# Patient Record
Sex: FEMALE | Race: WHITE
[De-identification: names, ages, dates, MRNs, and addresses within clinical notes are randomized per-mention and may not be internally consistent; named-entity substitution may affect disease eponyms.]

---

## 2017-12-13 ENCOUNTER — HOSPITAL ENCOUNTER (EMERGENCY)
Dept: HOSPITAL 57 - BURERS | Age: 82
Discharge: NURSING, REHAB OR LONG-TERM CARE | End: 2017-12-13
Payer: MEDICARE

## 2017-12-13 DIAGNOSIS — I10: ICD-10-CM

## 2017-12-13 DIAGNOSIS — S16.1XXA: ICD-10-CM

## 2017-12-13 DIAGNOSIS — I34.1: ICD-10-CM

## 2017-12-13 DIAGNOSIS — S80.00XA: ICD-10-CM

## 2017-12-13 DIAGNOSIS — Z79.82: ICD-10-CM

## 2017-12-13 DIAGNOSIS — S61.012A: Primary | ICD-10-CM

## 2017-12-13 DIAGNOSIS — Z79.4: ICD-10-CM

## 2017-12-13 DIAGNOSIS — Z85.3: ICD-10-CM

## 2017-12-13 DIAGNOSIS — K21.9: ICD-10-CM

## 2017-12-13 DIAGNOSIS — F32.9: ICD-10-CM

## 2017-12-13 DIAGNOSIS — Z79.899: ICD-10-CM

## 2017-12-13 DIAGNOSIS — E66.9: ICD-10-CM

## 2017-12-13 DIAGNOSIS — S70.00XA: ICD-10-CM
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
## 2017-12-13 DIAGNOSIS — I48.91: ICD-10-CM

## 2017-12-13 DIAGNOSIS — E78.5: ICD-10-CM

## 2017-12-13 DIAGNOSIS — E11.9: ICD-10-CM

## 2017-12-13 DIAGNOSIS — S00.83XA: ICD-10-CM

## 2017-12-13 PROCEDURE — 93005 ELECTROCARDIOGRAM TRACING: CPT

## 2017-12-13 PROCEDURE — 70450 CT HEAD/BRAIN W/O DYE: CPT

## 2017-12-13 PROCEDURE — 72125 CT NECK SPINE W/O DYE: CPT

## 2017-12-13 NOTE — RAD
LEFT HAND THREE VIEWS

12/13/17

 

No acute fracture was seen. Severe degenerative changes are present in the first carpometacarpal join
t. There may have been old trauma here. No acute carpal fractures were identified. The thumb appeared
 intact. 

 

IMPRESSION:  

No acute bony finding. 

 

POS: HOME

## 2017-12-13 NOTE — RAD
LEFT HIP THREE VIEWS

12/13/17

 

No fracture of the femoral head or neck was seen. The joint space is only mildly narrowed. There is n
o dislocation. 

 

Only one of the views shows all of the left pubic ring. On that view, there is a questionable line wh
ere the left inferior pubic ramus contacts the body of the pubis. I cannot exclude a minimal nondispl
aced fracture here, but other studies would be needed to confirm it. It could just as easily be an ov
erlapping line from the rectum and the gas within it. 

 

IMPRESSION:  

1.      No evidence of hip fracture. 

 

2.      Equivocal line at the junction of the left inferior pubic ramus and body of the pubis. 

 

Code T

 

POS: HOME

## 2017-12-13 NOTE — RAD
LEFT KNEE FOUR VIEWS

12/13/17

 

No fracture or joint effusion was seen. Degenerative changes consisting of osteophytes and slight med
ial joint space narrowing was noted. The patellofemoral joint is also involved by arthritic change. A
rteriosclerosis is seen in the popliteal artery. 

 

IMPRESSION:  

Degenerative changes but no acute findings. 

 

POS: HOME

## 2017-12-13 NOTE — CT
CT OF THE BRAIN WITHOUT CONTRAST

12/13/17

 

A noncontrast CT was performed following trauma. Comparison is made with prior CT exam dated 5/3.

 

No intracranial bleeding or extra-axial hematoma was seen. Profound deep white matter lucency and are
as of more focal hypolucency are consistent with chronic ischemic changes and probably lacunar infarc
ts. There has been no change since the prior scans, so none appear acute. As before, there is a 2.5 c
m mass that abuts the falx cerebri in the right frontal lobe consistent with a meningioma. Some calci
fication is seen at its margin. It size has not changed over time. The ventricles are normal in size 
for age and atrophy. No skull fractures were seen. The sphenoid sinus is clear as are the visible par
anasal sinuses. The mastoid air cells are clear. 

 

IMPRESSION:  

1.      No acute intracranial findings.

 

2.      2.5 cm right frontal meningioma, stable. 

 

3.      Diffuse prominent chronic ischemic changes. 

 

POS: HOME

## 2017-12-20 ENCOUNTER — HOSPITAL ENCOUNTER (EMERGENCY)
Dept: HOSPITAL 57 - BURERS | Age: 82
Discharge: ANOTHER HOSPITAL | End: 2017-12-20
Payer: MEDICARE

## 2017-12-20 ENCOUNTER — HOSPITAL ENCOUNTER (OUTPATIENT)
Dept: HOSPITAL 92 - ERS | Age: 82
Setting detail: OBSERVATION
LOS: 1 days | Discharge: NURSING, REHAB OR LONG-TERM CARE | End: 2017-12-21
Attending: INTERNAL MEDICINE | Admitting: INTERNAL MEDICINE
Payer: MEDICARE

## 2017-12-20 VITALS — BODY MASS INDEX: 26.5 KG/M2

## 2017-12-20 DIAGNOSIS — Z88.5: ICD-10-CM

## 2017-12-20 DIAGNOSIS — Z88.1: ICD-10-CM

## 2017-12-20 DIAGNOSIS — Z88.0: ICD-10-CM

## 2017-12-20 DIAGNOSIS — Z85.3: ICD-10-CM

## 2017-12-20 DIAGNOSIS — F32.9: ICD-10-CM

## 2017-12-20 DIAGNOSIS — Z79.01: ICD-10-CM

## 2017-12-20 DIAGNOSIS — E66.9: ICD-10-CM

## 2017-12-20 DIAGNOSIS — R07.9: ICD-10-CM

## 2017-12-20 DIAGNOSIS — Z79.899: ICD-10-CM

## 2017-12-20 DIAGNOSIS — Z90.12: ICD-10-CM

## 2017-12-20 DIAGNOSIS — Z91.041: ICD-10-CM

## 2017-12-20 DIAGNOSIS — Z66: ICD-10-CM

## 2017-12-20 DIAGNOSIS — K21.9: ICD-10-CM

## 2017-12-20 DIAGNOSIS — I48.91: ICD-10-CM

## 2017-12-20 DIAGNOSIS — I10: ICD-10-CM

## 2017-12-20 DIAGNOSIS — Z91.81: ICD-10-CM

## 2017-12-20 DIAGNOSIS — Z98.890: ICD-10-CM

## 2017-12-20 DIAGNOSIS — E11.9: ICD-10-CM

## 2017-12-20 DIAGNOSIS — E78.5: ICD-10-CM

## 2017-12-20 DIAGNOSIS — I34.1: ICD-10-CM

## 2017-12-20 DIAGNOSIS — Z79.4: ICD-10-CM
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
## 2017-12-20 DIAGNOSIS — I48.2: ICD-10-CM

## 2017-12-20 DIAGNOSIS — Z88.8: ICD-10-CM

## 2017-12-20 DIAGNOSIS — Z82.49: ICD-10-CM

## 2017-12-20 DIAGNOSIS — Z90.710: ICD-10-CM

## 2017-12-20 DIAGNOSIS — E78.00: ICD-10-CM

## 2017-12-20 DIAGNOSIS — Z92.3: ICD-10-CM

## 2017-12-20 DIAGNOSIS — Z85.841: ICD-10-CM

## 2017-12-20 DIAGNOSIS — Z91.5: ICD-10-CM

## 2017-12-20 DIAGNOSIS — Z79.82: ICD-10-CM

## 2017-12-20 DIAGNOSIS — N39.0: ICD-10-CM

## 2017-12-20 DIAGNOSIS — R07.9: Primary | ICD-10-CM

## 2017-12-20 DIAGNOSIS — R79.89: ICD-10-CM

## 2017-12-20 DIAGNOSIS — S80.11XA: Primary | ICD-10-CM

## 2017-12-20 DIAGNOSIS — Z90.49: ICD-10-CM

## 2017-12-20 LAB
ALBUMIN SERPL BCG-MCNC: 3.7 G/DL (ref 3.4–4.8)
ALP SERPL-CCNC: 76 U/L (ref 40–150)
ALP SERPL-CCNC: 79 U/L (ref 40–150)
ALT SERPL W P-5'-P-CCNC: 15 U/L (ref 8–55)
ALT SERPL W P-5'-P-CCNC: 18 U/L (ref 8–55)
ANION GAP SERPL CALC-SCNC: 16 MMOL/L (ref 10–20)
ANION GAP SERPL CALC-SCNC: 17 MMOL/L (ref 10–20)
APTT PPP: 35.5 SEC (ref 22.9–36.1)
AST SERPL-CCNC: 20 U/L (ref 5–34)
AST SERPL-CCNC: 20 U/L (ref 5–34)
BACTERIA UR QL AUTO: (no result) HPF
BASOPHILS # BLD AUTO: 0.1 THOU/UL (ref 0–0.2)
BASOPHILS # BLD AUTO: 0.1 THOU/UL (ref 0–0.2)
BASOPHILS NFR BLD AUTO: 0.5 % (ref 0–1)
BASOPHILS NFR BLD AUTO: 0.6 % (ref 0–1)
BILIRUB SERPL-MCNC: 0.5 MG/DL (ref 0.2–1.2)
BILIRUB SERPL-MCNC: 0.5 MG/DL (ref 0.2–1.2)
BUN SERPL-MCNC: 23 MG/DL (ref 9.8–20.1)
BUN SERPL-MCNC: 25 MG/DL (ref 9.8–20.1)
CALCIUM SERPL-MCNC: 10.2 MG/DL (ref 7.8–10.44)
CALCIUM SERPL-MCNC: 9.8 MG/DL (ref 7.8–10.44)
CHLORIDE SERPL-SCNC: 100 MMOL/L (ref 98–107)
CHLORIDE SERPL-SCNC: 99 MMOL/L (ref 98–107)
CK MB SERPL-MCNC: 1.7 NG/ML (ref 0–6.6)
CO2 SERPL-SCNC: 25 MMOL/L (ref 23–31)
CO2 SERPL-SCNC: 29 MMOL/L (ref 23–31)
CREAT CL PREDICTED SERPL C-G-VRATE: 0 ML/MIN (ref 70–130)
CREAT CL PREDICTED SERPL C-G-VRATE: 0 ML/MIN (ref 70–130)
DIGOXIN SERPL-MCNC: 1.16 NG/ML (ref 0.8–2)
EOSINOPHIL # BLD AUTO: 0.1 THOU/UL (ref 0–0.7)
EOSINOPHIL # BLD AUTO: 0.2 THOU/UL (ref 0–0.7)
EOSINOPHIL NFR BLD AUTO: 1.5 % (ref 0–10)
EOSINOPHIL NFR BLD AUTO: 2.1 % (ref 0–10)
GLOBULIN SER CALC-MCNC: 3 G/DL (ref 2.4–3.5)
GLOBULIN SER CALC-MCNC: 3.2 G/DL (ref 2.4–3.5)
GLUCOSE SERPL-MCNC: 71 MG/DL (ref 83–110)
HCT VFR BLD CALC: 39.4 % (ref 36–47)
HGB BLD-MCNC: 13 G/DL (ref 12–16)
INR PPP: 1.4
LYMPHOCYTES # BLD AUTO: 2.5 THOU/UL (ref 1.2–3.4)
LYMPHOCYTES # BLD: 2.2 THOU/UL (ref 1.2–3.4)
LYMPHOCYTES NFR BLD AUTO: 23.7 % (ref 21–51)
LYMPHOCYTES NFR BLD AUTO: 25.4 % (ref 21–51)
MAGNESIUM SERPL-MCNC: 1.9 MG/DL (ref 1.6–2.6)
MCH RBC QN AUTO: 28.7 PG (ref 27–31)
MCV RBC AUTO: 90 FL (ref 81–99)
MONOCYTES # BLD AUTO: 0.9 THOU/UL (ref 0.11–0.59)
MONOCYTES # BLD AUTO: 1 THOU/UL (ref 0.11–0.59)
MONOCYTES NFR BLD AUTO: 10.9 % (ref 0–10)
MONOCYTES NFR BLD AUTO: 9.5 % (ref 0–10)
NEUTROPHILS # BLD AUTO: 6 THOU/UL (ref 1.4–6.5)
NEUTROPHILS # BLD AUTO: 6.2 THOU/UL (ref 1.4–6.5)
NEUTROPHILS NFR BLD AUTO: 62.3 % (ref 42–75)
PLATELET # BLD AUTO: 227 THOU/UL (ref 130–400)
POTASSIUM SERPL-SCNC: 4.4 MMOL/L (ref 3.5–5.1)
PROTHROMBIN TIME: 17.1 SEC (ref 12–14.7)
RBC # BLD AUTO: 4.25 MILL/UL (ref 4.2–5.4)
RBC # BLD AUTO: 4.55 MILL/UL (ref 4.2–5.4)
SODIUM SERPL-SCNC: 140 MMOL/L (ref 136–145)
SP GR UR STRIP: 1.01 (ref 1–1.03)
TROPONIN I SERPL DL<=0.01 NG/ML-MCNC: (no result) NG/ML (ref ?–0.03)
TROPONIN I SERPL DL<=0.01 NG/ML-MCNC: 0.02 NG/ML (ref ?–0.03)
TROPONIN I SERPL DL<=0.01 NG/ML-MCNC: 0.03 NG/ML (ref ?–0.03)
TROPONIN I SERPL DL<=0.01 NG/ML-MCNC: 0.04 NG/ML (ref ?–0.03)
WBC # BLD AUTO: 9.4 THOU/UL (ref 4.8–10.8)
WBC # BLD AUTO: 9.9 THOU/UL (ref 4.8–10.8)

## 2017-12-20 PROCEDURE — G0378 HOSPITAL OBSERVATION PER HR: HCPCS

## 2017-12-20 PROCEDURE — 93005 ELECTROCARDIOGRAM TRACING: CPT

## 2017-12-20 PROCEDURE — 83735 ASSAY OF MAGNESIUM: CPT

## 2017-12-20 PROCEDURE — A9500 TC99M SESTAMIBI: HCPCS

## 2017-12-20 PROCEDURE — 85025 COMPLETE CBC W/AUTO DIFF WBC: CPT

## 2017-12-20 PROCEDURE — 94760 N-INVAS EAR/PLS OXIMETRY 1: CPT

## 2017-12-20 PROCEDURE — 51701 INSERT BLADDER CATHETER: CPT

## 2017-12-20 PROCEDURE — 81003 URINALYSIS AUTO W/O SCOPE: CPT

## 2017-12-20 PROCEDURE — A4216 STERILE WATER/SALINE, 10 ML: HCPCS

## 2017-12-20 PROCEDURE — 71010: CPT

## 2017-12-20 PROCEDURE — 84484 ASSAY OF TROPONIN QUANT: CPT

## 2017-12-20 PROCEDURE — 81015 MICROSCOPIC EXAM OF URINE: CPT

## 2017-12-20 PROCEDURE — 78452 HT MUSCLE IMAGE SPECT MULT: CPT

## 2017-12-20 PROCEDURE — 36415 COLL VENOUS BLD VENIPUNCTURE: CPT

## 2017-12-20 PROCEDURE — 36416 COLLJ CAPILLARY BLOOD SPEC: CPT

## 2017-12-20 PROCEDURE — 84443 ASSAY THYROID STIM HORMONE: CPT

## 2017-12-20 PROCEDURE — 93017 CV STRESS TEST TRACING ONLY: CPT

## 2017-12-20 PROCEDURE — 84100 ASSAY OF PHOSPHORUS: CPT

## 2017-12-20 PROCEDURE — 99285 EMERGENCY DEPT VISIT HI MDM: CPT

## 2017-12-20 PROCEDURE — 87186 SC STD MICRODIL/AGAR DIL: CPT

## 2017-12-20 PROCEDURE — 72170 X-RAY EXAM OF PELVIS: CPT

## 2017-12-20 PROCEDURE — 85730 THROMBOPLASTIN TIME PARTIAL: CPT

## 2017-12-20 PROCEDURE — 82553 CREATINE MB FRACTION: CPT

## 2017-12-20 PROCEDURE — 80162 ASSAY OF DIGOXIN TOTAL: CPT

## 2017-12-20 PROCEDURE — 85610 PROTHROMBIN TIME: CPT

## 2017-12-20 PROCEDURE — 82962 GLUCOSE BLOOD TEST: CPT

## 2017-12-20 PROCEDURE — 96374 THER/PROPH/DIAG INJ IV PUSH: CPT

## 2017-12-20 PROCEDURE — 87077 CULTURE AEROBIC IDENTIFY: CPT

## 2017-12-20 PROCEDURE — 80053 COMPREHEN METABOLIC PANEL: CPT

## 2017-12-20 PROCEDURE — 87086 URINE CULTURE/COLONY COUNT: CPT

## 2017-12-20 PROCEDURE — 70450 CT HEAD/BRAIN W/O DYE: CPT

## 2017-12-20 PROCEDURE — A4353 INTERMITTENT URINARY CATH: HCPCS

## 2017-12-20 PROCEDURE — 80048 BASIC METABOLIC PNL TOTAL CA: CPT

## 2017-12-20 NOTE — RAD
PELVIS ONE VIEW

12/20/17

 

No fracture was seen. The pelvic bones appear intact. The hips appear intact. The symphysis shows no 
widening or off-set. The SI joints are symmetrical. 

 

IMPRESSION:  

No acute bony findings. 

 

POS: HOME

## 2017-12-20 NOTE — RAD
PORTABLE CHEST

12/20/17

 

PROVIDED CLINICAL HISTORY:  

Chest pain.

 

FINDINGS:  

Comparison 12/20/17, 8:57 a.m.

 

The cardiac and mediastinal silhouette remains within normal limits. There is no focal consolidation,
 pleural fluid or pneumothorax apparent. Atherosclerosis is demonstrated. 

 

IMPRESSION:  

No evidence for an acute cardiopulmonary process. 

 

POS: [facility]

## 2017-12-20 NOTE — HP
PRIMARY CARE PROVIDER:  Eleazar Julio M.D.

 

CHIEF COMPLAINT:  Chest pain.

 

HISTORY OF PRESENT ILLNESS:  Ms. Kidd is a pleasant 88-year-old lady who was seen at [facility] on 12/20/2017 after transferred from [place].

 

She is able to provide a good history.  Two weeks ago, she sustained a fall and hit her head as well 
as left upper extremity.  She reports that there were no fractures.  She was doing well until today w
hen she tripped and fell on her buttocks.  Following that, she was sent to the emergency room at [facility].  There, she developed chest pain.  She reports it as left-sided, sharp, 10/10 at its worst,
 nonradiating, accompanied by shortness of breath, and not accompanied by nausea or dizziness.  On fu
rther questioning, she reports that she has been having this pain on and off for the last 3 months.  
She cannot recall any aggravating or relieving factors.

 

REVIEW OF SYSTEMS:  The following complete review of systems was negative, unless otherwise mentioned
 in the HPI or below:

Constitutional:  Weight loss or gain, sense of well-being, ability to conduct usual activities, exerc
ise tolerance.

Skin/Breast:  Rash, itching, changes in hair growth or loss, nail changes, breast lumps, tenderness, 
swelling, nipple discharge.

Eyes:  Vision, double vision, tearing, blind spots, pain.

ENT/Mouth:  Headaches (location, time of onset, duration, precipitating factors), vertigo, lightheade
dness, injury. Vision, double vision, tearing, blind spots, pain, nose bleeding, colds, obstruction, 
discharge, dental difficulties, gingival bleeding, dentures, neck stiffness, pain, tenderness, masses
 in thyroid or other areas.

Cardiovascular:  Precordial pain, substernal distress, palpitations, syncope, dyspnea on exertion, or
thopnea, nocturnal paroxysmal dyspnea, edema, cyanosis, hypertension, heart murmurs, varicosities, ph
lebitis, claudication.

Respiratory:  Pain, shortness of breath, wheezing, stridor, cough, hemoptysis, fever or night sweats.


Gastrointestinal:  Poor appetite, dysphagia, indigestion, abdominal pain, heartburn, eructation, naus
ea, vomiting, hematemesis, jaundice, constipation, or diarrhea, abnormal stools (nimo-colored, tarry,
 bloody, greasy, foul smelling), flatulence, hemorrhoids, recent changes in bowel habits.

Genitourinary:  Urgency, frequency, dysuria, nocturia, hematuria, polyuria, oliguria, unusual (or mark
nge in) color of urine, stones, hesitancy, change in size of stream, dribbling, acute retention or in
continence, libido, potency.

Musculoskeletal:  Pain, swelling, redness or heat of muscles or joints, limitation, of motion, muscul
ar weakness, atrophy, cramps.

Neurologic/Psychiatric:  Convulsions, paralyses, tremor, incoordination, paresthesias, difficulties w
ith memory of speech, sensory or motor disturbances, or muscular coordination (ataxia, tremor), emoti
onal problems, anxiety, depression, previous psychiatric care, unusual perceptions, hallucinations.

Allergy/Immunologic:  Skin rash, anemia, bleeding tendency, polydipsia, polyuria, intolerance to heat
 or cold.

 

PAST MEDICAL HISTORY:  Significant for chronic atrial fibrillation, diabetes mellitus type 2, hyperte
nsion, mitral valve prolapse, gastroesophageal reflux disease, meningioma.

 

PAST SURGICAL HISTORY:  Significant for appendectomy, hysterectomy, cholecystectomy, and left mastect
rhoda for benign lesion.

 

CODE STATUS:  I discussed with code status.  She is DNR.

 

FAMILY HISTORY:  Diabetes mellitus and heart disease in her mother, heart attacks in her brothers and
 cancer and heart disease in her sisters.

 

ALLERGIES:  IODINE, LEVOFLOXACIN, PENICILLIN G, CODEINE, and DIAZEPAM.

 

CURRENT MEDICATIONS:  This list will need to be reviewed, but appears to include Maalox, vitamin C, d
igoxin, Aricept, Cymbalta, Pepcid, Lasix, gabapentin, Lantus insulin, isosorbide mononitrate, lisinop
ril, metformin, Lopressor, MiraLax, rivaroxaban, simvastatin, and tramadol.

 

SOCIAL HISTORY:  The patient denies tobacco use, alcohol use or recreational drug use.

 

PHYSICAL EXAMINATION:

GENERAL:  On examination, Ms. Kidd is awake and alert, not in acute distress.

VITAL SIGNS:  Blood pressure is 127/74, pulse is 53.  She is breathing at rate of 21 and saturating 9
5% on room air.  She is afebrile.

EYES:  No scleral icterus, no conjunctival pallor.

ENT:  Moist mucosal membranes, no oropharyngeal erythema or exudates.

NECK:  Supple, nontender, normal range of movement, trachea is midline.

RESPIRATORY:  Accessory muscles of breathing are not active.

LUNGS:  Clear to auscultation without wheeze, rhonchi or crepitations.

CARDIOVASCULAR:  S1 and S2 are heard, regular.

LUNGS:  Peripheral pulses palpable.  No carotid bruit, no pericardial rub.

ABDOMEN:  Soft, nontender, bowel sounds heard, no hepatomegaly, no splenomegaly.

MUSCULOSKELETAL:  Power is 5/5 in all 4 extremities.  Normal range of movement at all major extremity
 joints.

SKIN:  Hematoma over the right shin, hematoma over the left thumb.

LYMPHATIC:  No cervical lymphadenopathy.

NEUROLOGIC:  Cranial nerves II-XII are intact, deep tendon reflexes are 2+.

PSYCHIATRIC:  Normal mood, normal affect, patient is oriented to person, place, and time.

 

LABORATORY DATA:  Ms. Kidd's labs and investigations were reviewed.  I reviewed her electrocardiog
ayaz, which shows atrial fibrillation with slow ventricular response, T-wave inversion in the inferior
 leads and ST segment depressions in the lateral leads.  I also reviewed her chest x-ray, which does 
not show any pulmonary infiltrates.  Laboratory investigations show an unremarkable CBC, unremarkable
 comprehensive metabolic profile except for blood urea nitrogen elevated at 23 and a normal TSH.  Tro
ponin I was 0.018 at 1903 hours, 0.037 at 0820 hours.  Urinalysis is positive for small amount of chema
kocyte esterase and for nitrite.

 

ASSESSMENT AND PLAN:  Ms. Kidd is a pleasant 88-year-old lady who was seen at [facility] on 12/20/2017.  Her problem list includes:

1.  Chest pain:  Ms. Kidd has significant risk factors for coronary artery disease.  She will be a
dmitted to the hospital for telemetry monitoring and for ruling out acute coronary syndrome.  Nuclear
 stress test will be ordered.

2.  Fall:  She will be seen by walking service program to assess her mobility.  She appears to have h
ad mechanical fall.

3.  Urinary tract infection:  Given her allergy to LEVOFLOXACIN and PENICILLIN, we will start Ms. Desiree scott on nitrofurantoin and await urine cultures.

4.  Atrial fibrillation:  Appears to be stable.

5.  Diabetes mellitus:  Start Accu-Cheks, insulin sliding scale.

6.  Dyslipidemia:  Continue her home medications.

7.  Hypertension:  Monitor vital signs, titrate antihypertensives as needed.

 

Many thanks for allowing me to participate in your patient's care.  Please feel free to contact me wi
th any questions or concerns.

 

LEVEL OF RISK:  High.

 

LEVEL OF COMPLEXITY:  High.

## 2017-12-20 NOTE — RAD
PORTABLE CHEST

12/20/17

 

An AP portable film at 0857 is compared with a 9/12/17 study. 

 

The heart is mildly enlarged but unchanged in size. There is no mediastinal widening or shift. The tiffanie
ngs are clear. There are no effusions. 

 

IMPRESSION:  

No acute thoracic finding. 

 

POS: HOME

## 2017-12-20 NOTE — CT
CT OF THE BRAIN WITHOUT CONTRAST

12/20/17

 

Comparison is made with the 12/13 study. 

 

Again  noted is the 2.5 cm right falx meningioma anteriorly. No intracranial bleeding or extra-axial 
hematoma was seen. There is no sign of edema. The ventricular sizes are normal for age and atrophy. D
iffuse ischemic changes are present as usual. The calvarium appears intact. There is no air fluid lev
el in the sphenoid sinus or any of the visible paranasal sinuses. 

 

IMPRESSION:  

1.      No acute traumatic intracranial findings. 

2.      2.5 cm right frontal mass consistent with a meningioma. Unchanged from prior study. 

 

POS: HOME

## 2017-12-21 VITALS — TEMPERATURE: 98.5 F | SYSTOLIC BLOOD PRESSURE: 134 MMHG | DIASTOLIC BLOOD PRESSURE: 63 MMHG

## 2017-12-21 LAB
ANION GAP SERPL CALC-SCNC: 14 MMOL/L (ref 10–20)
BASOPHILS # BLD AUTO: 0 THOU/UL (ref 0–0.2)
BASOPHILS NFR BLD AUTO: 0.3 % (ref 0–1)
BUN SERPL-MCNC: 22 MG/DL (ref 9.8–20.1)
CALCIUM SERPL-MCNC: 9.5 MG/DL (ref 7.8–10.44)
CHLORIDE SERPL-SCNC: 99 MMOL/L (ref 98–107)
CO2 SERPL-SCNC: 28 MMOL/L (ref 23–31)
CREAT CL PREDICTED SERPL C-G-VRATE: 59 ML/MIN (ref 70–130)
EOSINOPHIL # BLD AUTO: 0.1 THOU/UL (ref 0–0.7)
EOSINOPHIL NFR BLD AUTO: 1.9 % (ref 0–10)
HCT VFR BLD CALC: 36.8 % (ref 36–47)
LYMPHOCYTES # BLD: 2 THOU/UL (ref 1.2–3.4)
LYMPHOCYTES NFR BLD AUTO: 27 % (ref 21–51)
MONOCYTES # BLD AUTO: 1 THOU/UL (ref 0.11–0.59)
MONOCYTES NFR BLD AUTO: 13.4 % (ref 0–10)
NEUTROPHILS # BLD AUTO: 4.2 THOU/UL (ref 1.4–6.5)
RBC # BLD AUTO: 4.03 MILL/UL (ref 4.2–5.4)
WBC # BLD AUTO: 7.3 THOU/UL (ref 4.8–10.8)

## 2017-12-21 NOTE — NM
CARDIAC SPECT:

 

CLINICAL HISTORY:

Chest pain, hypertension, diabetes, atrial fibrillation, and dyslipidemia. 

 

TECHNIQUE:  

A myocardial perfusion scan was performed using the single isotope one day protocol with technetium-9
9m sestamibi. 9 mCi were injected intravenously for the rest exam followed by 27 mCi for the stress e
xam. Pharmacologic stress with Lexiscan was monitored and interpreted by Dr. Bryant. 

 

FINDINGS:

Homogeneous tracer distribution is seen in the myocardial segments on stress and rest images without 
fixed or reversible defects. 

 

GATED SPECT LVEF:

65%. 

 

WALL MOTION EXAM:

Normal. 

 

IMPRESSION: 

No evidence of reversible ischemia. 

 

 

POS: [facility]

## 2017-12-21 NOTE — PDOC.PN
- Subjective


Encounter Start Date: 12/21/17


Encounter Start Time: 08:34


Subjective: Seen and examined feeling okay





- Objective


Resuscitation Status: 


 











Resuscitation Status           DNR:Do Not Resuscitate














Vital Signs & Weight: 


 Vital Signs (12 hours)











  Temp Pulse Resp BP Pulse Ox


 


 12/21/17 08:17  97.5 F L  63  16  131/63  97


 


 12/21/17 03:45  97.9 F  66  16  119/57 L  96


 


 12/20/17 22:35  97.8 F  56 L  16  


 


 12/20/17 22:00      94 L


 


 12/20/17 21:32  97.8 F  56 L  16  113/58 L  94 L








 Weight











Weight                         169 lb 4.8 oz














I&O: 


 











 12/20/17 12/21/17 12/22/17





 06:59 06:59 06:59


 


Intake Total  480 


 


Balance  480 











Result Diagrams: 


 12/21/17 05:44





 12/21/17 05:44


Additional Labs: 


 Accuchecks











  12/21/17 12/20/17





  05:44 22:39


 


POC Glucose  159 H  129 H














Phys Exam





- Physical Examination


Constitutional: NAD


HEENT: PERRLA, moist MMs, sclera anicteric, TM's clear


Neck: no nodes, no JVD, supple, full ROM


Respiratory: no wheezing, no rales, no rhonchi, clear to auscultation bilateral


Cardiovascular: RRR, no significant murmur, no rub


Gastrointestinal: soft, non-tender, no distention, positive bowel sounds


Musculoskeletal: no edema, pulses present





Dx/Plan


(1) Fall at home


Code(s): W19.XXXA - UNSPECIFIED FALL, INITIAL ENCOUNTER; Y92.099 - UNSP PLACE 

IN [facility] NON-INSTITUTIONAL RESIDENCE AS PLACE   Status: Acute   





(2) UTI (urinary tract infection)


Status: Acute   Comment: Present on Admission.   





(3) Atrial fibrillation


Code(s): I48.91 - UNSPECIFIED ATRIAL FIBRILLATION   Status: Chronic   





(4) Chest pain


Code(s): R07.9 - CHEST PAIN, UNSPECIFIED   Status: Acute   





- Plan


continue antibiotics, PT/OT, 


follow up on urine cultures


-: Continue NTF for now





* .

## 2017-12-26 ENCOUNTER — HOSPITAL ENCOUNTER (INPATIENT)
Dept: HOSPITAL 92 - ERS | Age: 82
LOS: 4 days | Discharge: NURSING, REHAB OR LONG-TERM CARE | DRG: 309 | End: 2017-12-30
Attending: INTERNAL MEDICINE | Admitting: INTERNAL MEDICINE
Payer: MEDICARE

## 2017-12-26 VITALS — BODY MASS INDEX: 26.6 KG/M2

## 2017-12-26 DIAGNOSIS — Z66: ICD-10-CM

## 2017-12-26 DIAGNOSIS — R00.1: Primary | ICD-10-CM

## 2017-12-26 DIAGNOSIS — E78.5: ICD-10-CM

## 2017-12-26 DIAGNOSIS — Z91.81: ICD-10-CM

## 2017-12-26 DIAGNOSIS — I10: ICD-10-CM

## 2017-12-26 DIAGNOSIS — I34.1: ICD-10-CM

## 2017-12-26 DIAGNOSIS — S80.11XA: ICD-10-CM

## 2017-12-26 DIAGNOSIS — E11.9: ICD-10-CM
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
## 2017-12-26 DIAGNOSIS — T46.0X5A: ICD-10-CM

## 2017-12-26 DIAGNOSIS — I48.2: ICD-10-CM

## 2017-12-26 DIAGNOSIS — E87.2: ICD-10-CM

## 2017-12-26 DIAGNOSIS — T38.3X5A: ICD-10-CM

## 2017-12-26 DIAGNOSIS — Z79.4: ICD-10-CM

## 2017-12-26 LAB
ALBUMIN SERPL BCG-MCNC: 3.5 G/DL (ref 3.4–4.8)
ALP SERPL-CCNC: 84 U/L (ref 40–150)
ALT SERPL W P-5'-P-CCNC: 15 U/L (ref 8–55)
ANION GAP SERPL CALC-SCNC: 15 MMOL/L (ref 10–20)
AST SERPL-CCNC: 17 U/L (ref 5–34)
BASOPHILS # BLD AUTO: 0 THOU/UL (ref 0–0.2)
BASOPHILS NFR BLD AUTO: 0.2 % (ref 0–1)
BILIRUB SERPL-MCNC: 0.4 MG/DL (ref 0.2–1.2)
BUN SERPL-MCNC: 20 MG/DL (ref 9.8–20.1)
CALCIUM SERPL-MCNC: 9.2 MG/DL (ref 7.8–10.44)
CHLORIDE SERPL-SCNC: 98 MMOL/L (ref 98–107)
CK MB SERPL-MCNC: 1.2 NG/ML (ref 0–6.6)
CK SERPL-CCNC: 22 U/L (ref 29–168)
CO2 SERPL-SCNC: 30 MMOL/L (ref 23–31)
CREAT CL PREDICTED SERPL C-G-VRATE: 0 ML/MIN (ref 70–130)
DIGOXIN SERPL-MCNC: 1.13 NG/ML (ref 0.8–2)
EOSINOPHIL # BLD AUTO: 0.4 THOU/UL (ref 0–0.7)
EOSINOPHIL NFR BLD AUTO: 4 % (ref 0–10)
GLOBULIN SER CALC-MCNC: 3 G/DL (ref 2.4–3.5)
GLUCOSE SERPL-MCNC: 98 MG/DL (ref 83–110)
HGB BLD-MCNC: 12.4 G/DL (ref 12–16)
INR PPP: 1.2
LYMPHOCYTES # BLD: 4 THOU/UL (ref 1.2–3.4)
LYMPHOCYTES NFR BLD AUTO: 40.7 % (ref 21–51)
MCH RBC QN AUTO: 30.3 PG (ref 27–31)
MCV RBC AUTO: 93.2 FL (ref 81–99)
MONOCYTES # BLD AUTO: 0.9 THOU/UL (ref 0.11–0.59)
MONOCYTES NFR BLD AUTO: 8.8 % (ref 0–10)
NEUTROPHILS # BLD AUTO: 4.5 THOU/UL (ref 1.4–6.5)
NEUTROPHILS NFR BLD AUTO: 46.3 % (ref 42–75)
PLATELET # BLD AUTO: 272 THOU/UL (ref 130–400)
POTASSIUM SERPL-SCNC: 4.9 MMOL/L (ref 3.5–5.1)
PROTHROMBIN TIME: 15 SEC (ref 12–14.7)
RBC # BLD AUTO: 4.09 MILL/UL (ref 4.2–5.4)
SODIUM SERPL-SCNC: 138 MMOL/L (ref 136–145)
TROPONIN I SERPL DL<=0.01 NG/ML-MCNC: 0.02 NG/ML (ref ?–0.03)
WBC # BLD AUTO: 9.8 THOU/UL (ref 4.8–10.8)

## 2017-12-26 PROCEDURE — 83880 ASSAY OF NATRIURETIC PEPTIDE: CPT

## 2017-12-26 PROCEDURE — 96360 HYDRATION IV INFUSION INIT: CPT

## 2017-12-26 PROCEDURE — 80162 ASSAY OF DIGOXIN TOTAL: CPT

## 2017-12-26 PROCEDURE — 85730 THROMBOPLASTIN TIME PARTIAL: CPT

## 2017-12-26 PROCEDURE — 85025 COMPLETE CBC W/AUTO DIFF WBC: CPT

## 2017-12-26 PROCEDURE — 83605 ASSAY OF LACTIC ACID: CPT

## 2017-12-26 PROCEDURE — 94760 N-INVAS EAR/PLS OXIMETRY 1: CPT

## 2017-12-26 PROCEDURE — 80053 COMPREHEN METABOLIC PANEL: CPT

## 2017-12-26 PROCEDURE — 93005 ELECTROCARDIOGRAM TRACING: CPT

## 2017-12-26 PROCEDURE — 85610 PROTHROMBIN TIME: CPT

## 2017-12-26 PROCEDURE — 84484 ASSAY OF TROPONIN QUANT: CPT

## 2017-12-26 PROCEDURE — 36416 COLLJ CAPILLARY BLOOD SPEC: CPT

## 2017-12-26 PROCEDURE — 96361 HYDRATE IV INFUSION ADD-ON: CPT

## 2017-12-26 PROCEDURE — 71010: CPT

## 2017-12-26 PROCEDURE — 87040 BLOOD CULTURE FOR BACTERIA: CPT

## 2017-12-26 PROCEDURE — 82553 CREATINE MB FRACTION: CPT

## 2017-12-26 PROCEDURE — 36415 COLL VENOUS BLD VENIPUNCTURE: CPT

## 2017-12-26 NOTE — ULT
ULTRASOUND WITH DOPPLER DUPLEX VENOUS LOWER EXTREMITY RIGHT

 

CPT: 18966

ICD-10-PCS: B54D

 

HISTORY: Right leg edema.

 

TECHNIQUE:

Color flow Doppler, spectral waveform analysis of pulsed Doppler, and gray-scale imaging with renata
gilles and augmentation, were used to evaluate the right common femoral, femoral, popliteal, posterior 
tibial, and superficial femoral, veins; and the proximal portions of the profunda femoral and greater
 saphenous, veins.

 

FINDINGS:

Appropriate compression and flow within the imaged deep vein system of the right lower extremity.

 

IMPRESSION:

No DVT.

 

POS: [facility]

## 2017-12-26 NOTE — RAD
CHEST ONE VIEW

12/26/17

 

HISTORY: 

Dyspnea. 

 

COMPARISON:  

12/20/17.

 

FINDINGS:  

The cardiac silhouette is magnified and enlarged. Pulmonary vasculature is unremarkable. Mediastinum 
is midline with aortic calcification. There is no confluent air space consolidation or evidence of pn
eumothorax. Postoperative changes of the left axilla are apparent. Cardiac monitor leads overlie the 
chest. 

 

IMPRESSION:  

Chronic type findings are stable. No active cardiopulmonary abnormalities are demonstrated. 

 

POS: [facility]

## 2017-12-27 RX ADMIN — Medication SCH MG: at 21:02

## 2017-12-27 NOTE — HP
CHIEF COMPLAINT:  Feels dizziness and right leg pain.

 

HISTORY OF PRESENT ILLNESS:  She is an 88-year-old woman with a history of 
hypertension, arrhythmias, atrial fibrillation, mitral valve prolapse, type 2 
diabetes.  She came in because she was sent from nursing home because she has 
multiple falls and feeling dizziness and has swelling in right lower 
extremities.  The patient came in the ER, blood pressure 98/57, pulse 50, pulse 
went down to 40 with the respiratory rate 20, temperature 97.8.  The patient 
was admitted for dizziness, junctional bradycardia and possible DVT in the 
right leg.

 

PAST MEDICAL HISTORY:  As mentioned, history of atrial fibrillation, mitral 
valve prolapse, hyperlipidemia, hypertension.

 

PAST SURGICAL HISTORY:  History of cholecystectomy, mastectomy of left breast, 
bladder suspension surgery, appendectomy.

 

PSYCHIATRIC HISTORY:  Depression.

 

SOCIAL HISTORY:  No alcohol use, no drug use, no smoking history.

 

MEDICATION HISTORY:  She is taking a lot of medicine.

 

ALLERGIES:  She has an allergy to LEVAQUIN, PENICILLIN, and VALIUM.

 

MEDICATIONS:  She is taking Lantus for diabetes, losartan 50 mg daily, 
metformin 1000 twice daily, Xarelto 10 mg daily, Zocor 20 mg daily, calcium 1 
tablet twice daily, Cymbalta 30 mg daily, digoxin 0.125 mg daily, Imdur 30 mg 
daily, lisinopril 5 mg daily, Aricept 5 mg daily, simvastatin 20 mg daily, 
gabapentin 400 mg twice daily; glimepiride 2 mg once daily, nebulizer, aspirin 
81 daily, Protonix 40 mg daily.

 

REVIEW OF SYSTEMS:  HEENT:  She denies any headache, does have dizziness.  Ear, 
nose, throat, no problems.  Cardiovascular:  No chest pain, no palpitation.  
Respirations:  Some shortness of breath, no cough.  Gastrointestinal:  No nausea
, no vomiting.  Musculoskeletal:  She has swelling of right leg.  Skin:  Some 
bruises on the right leg.  Neurologic:  She has some dizziness.  Review of 
systems was negative except for history and physical.

 

PHYSICAL EXAMINATION:

GENERAL:  She is an elderly woman looks younger than age.  Alert, oriented, not 
in distress.

VITAL SIGNS:  Pulse 40s, bradycardic.

HEENT:  Head is atraumatic, normocephalic.  Pupils round and reactive.  
External ocular muscles are intact.  Ears, nose, throat normal.  Tongue mucosa 
moist.

NECK:  Supple, no JVD, no thyromegaly, no carotid bruit.

CARDIOVASCULAR:  S1, S2 audible.  Bradycardic.  No S3, S4.

ABDOMEN:  Soft.  Bowel sounds audible.  No organomegaly.

RESPIRATIONS:  Diminished breath sounds, no wheezing, no crackles.  Chest wall, 
no tenderness.

NEUROLOGICAL:  Alert, oriented x3.  No focal deficit.

 

LABORATORY DATA:  Her labs shows lactic acid 2.8.  CBC shows WBC 9.8, 
hemoglobin 12.4, hematocrit 38.1, platelets 272.  Digoxin level 1.13, it is 
normal range.  Sodium 138, potassium 4.9, chloride 30, anion gap 15, BUN 20, 
creatinine 0.94, glucose 98, calcium 9.2, bilirubin 0.4, albumin 3.5, globulin 
1.2, AST 17, ALT 15.  PTT 33, INR 1.2.  Troponin 0.017, CK 1.2.  .8.

 

ASSESSMENT AND PLAN:

1.  Dizziness with junctional bradycardia could be  sec to Meds, so we will 
rule out ischemia by serial troponin, echocardiogram, and hold her digoxin and 
other AV uyri blocking agent.  Cardiology consulted, pacemaker at the bedside.

2.  Injury to the right leg with deep venous thrombosis.  Doppler scan Pending 
and we will follow the report.

3.  Type 2 diabetes, insulin requiring.  Continue sliding scales and we will 
follow her.

4.  Deep venous thrombosis prophylaxis with Lovenox.

 

MTDD

## 2017-12-27 NOTE — PDOC.PN
- Subjective


Encounter Start Date: 12/27/17


Encounter Start Time: 15:20


Subjective: Pt seen and examined for Junctional bradycardia


-: Feeling better


-: no CP, SOB





- Objective


MAR Reviewed: Yes


Vital Signs & Weight: 


 Vital Signs (12 hours)











  Temp Pulse Resp BP Pulse Ox


 


 12/27/17 15:12  97.9 F  62  20  105/57 L  95


 


 12/27/17 11:45  97.6 F  64  16  106/59 L  94 L


 


 12/27/17 08:40  98.0 F  58 L  16  


 


 12/27/17 08:05  98.0 F  58 L  16  110/62  95


 


 12/27/17 05:00   64  18  102/57 L 


 


 12/27/17 04:00  97.4 F L  61  17  96/54 L  95








 Weight











Weight                         170 lb 6.4 oz














I&O: 


 











 12/26/17 12/27/17 12/28/17





 06:59 06:59 06:59


 


Intake Total  705 


 


Balance  705 











Result Diagrams: 


 12/26/17 15:50





 12/26/17 15:50


Additional Labs: 


 Accuchecks











  12/27/17 12/27/17 12/26/17





  11:08 06:14 20:44


 


POC Glucose  154 H  90  91














Phys Exam





- Physical Examination


HEENT: PERRLA, moist MMs, sclera anicteric, TM's clear, oral pharynx no lesions

, 2+ tonsils


Respiratory: no wheezing, no rales, no rhonchi, wheezing present, clear to 

auscultation bilateral


Cardiovascular: RRR, no significant murmur, no rub, gallop, irregular


Musculoskeletal: no edema, pulses present, edema present


Neurological: non-focal, normal sensation, moves all 4 limbs


Psychiatric: normal affect, A&O x 3





Dx/Plan





- Plan


1) Junctional Bradycardia, Hold digoxin 


-: 2) continue Imdur and losartan


-: 3) Cardiology consulted





* .








Review of Systems





- Medications/Allergies


Allergies/Adverse Reactions: 


 Allergies











Allergy/AdvReac Type Severity Reaction Status Date / Time


 


iodine Allergy   Verified 12/26/17 23:06


 


levofloxacin [From Levaquin] Allergy   Verified 12/20/17 23:44


 


penicillin G Allergy   Verified 12/26/17 23:06


 


codeine AdvReac   Verified 12/26/17 23:06


 


diazepam [From Valium] AdvReac   Verified 12/26/17 23:06











Medications: 


 Current Medications





Acetaminophen (Tylenol)  500 mg PO Q6H PRN


   PRN Reason: Mild Pain (1-3)


Al Hydroxide/Mg Hydroxide (Maalox)  30 ml PO Q8H PRN


   PRN Reason: Indigestion


Ascorbic Acid (Vitamin C)  500 mg PO BID [facility]


Aspirin (Ecotrin)  81 mg PO QAM [facility]


Atorvastatin Calcium (Lipitor)  10 mg PO HS [facility]


Baclofen (Lioresal)  10 mg PO TID PRN


   PRN Reason: muscle spasms


Calcium/Vitamin D (Caltrate 600 + Vit D)  1 tab PO DAILY [facility]


Dextromethorphan Polistirix (Delsym 30 Mg/5 Ml 89 Ml Bot)  300 mg PO Q4H PRN


   PRN Reason: Cough


Donepezil HCl (Aricept)  10 mg PO HS [facility]


Duloxetine HCl (Cymbalta)  60 mg PO QPM [facility]


Famotidine (Pepcid)  20 mg PO BID [facility]


Furosemide (Lasix)  40 mg PO QAM [facility]


Gabapentin (Neurontin)  400 mg PO TID [facility]


   Last Admin: 12/27/17 15:11 Dose:  400 mg


Sodium Chloride (Normal Saline 0.9%)  1,000 mls @ 70 mls/hr IV .[id number] [facility]


   Last Admin: 12/27/17 11:43 Dose:  1,000 mls


Insulin Detemir 25 units/ (Miscellaneous Medication)  0.25 mls @ 0 mls/hr SC 

BID [facility]


Iron/Minerals/Multivitamins (Theragran M)  1 tab PO DAILY [facility]


Isosorbide Mononitrate (Imdur Er)  30 mg PO QAM [facility]


Losartan Potassium (Cozaar)  50 mg PO QAM [facility]


Metformin HCl (Glucophage)  1,000 mg PO BID[facility]


Ondansetron HCl (Zofran Odt)  4 mg PO Q6H PRN


   PRN Reason: Nausea/Vomiting


Pantoprazole Sodium (Protonix)  40 mg PO DAILY [facility]


Cranberry Fruit [ (Cranberry] 450 Mg)  0 each PO BID [facility]


Psyllium Hydrophilic Mucilloid (Metamucil)  1 pk PO DAILYPRN PRN


   PRN Reason: Constipation


Rivaroxaban (Xarelto)  10 mg PO DAILY [facility]


Tramadol HCl (Ultram)  50 mg PO Q6H PRN


   PRN Reason: Moderate to Severe Pain (6-10)


Tramadol HCl (Ultram)  50 mg PO QAM [facility]

## 2017-12-28 LAB — TROPONIN I SERPL DL<=0.01 NG/ML-MCNC: 0.03 NG/ML (ref ?–0.03)

## 2017-12-28 RX ADMIN — Medication SCH MG: at 21:08

## 2017-12-28 RX ADMIN — MULTIPLE VITAMINS W/ MINERALS TAB SCH TAB: TAB at 09:14

## 2017-12-28 RX ADMIN — ASPIRIN SCH MG: 81 TABLET ORAL at 09:13

## 2017-12-28 RX ADMIN — Medication SCH TAB: at 09:13

## 2017-12-28 RX ADMIN — Medication SCH MG: at 09:13

## 2017-12-28 NOTE — PDOC.PN
- Subjective


Encounter Start Date: 12/28/17


Encounter Start Time: 16:21


Subjective: pt seen and examined


-: pt denies any complain





- Objective


Vital Signs & Weight: 


 Vital Signs (12 hours)











  Temp Pulse Resp BP Pulse Ox


 


 12/28/17 11:10  97.5 F L  56 L  18  114/56 L  94 L


 


 12/28/17 09:06  97.5 F L  64  17  135/71  97








 Weight











Weight                         171 lb 12.8 oz














I&O: 


 











 12/27/17 12/28/17 12/29/17





 06:59 06:59 06:59


 


Intake Total 705 2463 


 


Balance 705 2463 











Result Diagrams: 


 12/26/17 15:50





 12/26/17 15:50


Additional Labs: 


 Accuchecks











  12/28/17 12/28/17 12/27/17





  11:11 05:37 20:41


 


POC Glucose  202 H  70  120 H














  12/27/17





  16:54


 


POC Glucose  158 H











Radiology Reviewed by me: Yes





Phys Exam





- Physical Examination


HEENT: PERRLA, moist MMs, sclera anicteric, TM's clear, oral pharynx no lesions

, 2+ tonsils


Neck: no nodes, no JVD, supple, full ROM


Respiratory: no wheezing, no rales, no rhonchi, wheezing present, clear to 

auscultation bilateral


Cardiovascular: RRR, no significant murmur, no rub, gallop, irregular


Gastrointestinal: soft, non-tender, no distention, positive bowel sounds


Musculoskeletal: no edema, pulses present, edema present


Neurological: non-focal, normal sensation, moves all 4 limbs


Psychiatric: normal affect, A&O x 3





Dx/Plan





- Plan


DVT proph w/lovenox


Continue to Hold Digoxin 


-: Continue Coreg, Cardiology consult is pending


-: Continue Levenir and sliding scale for DM


-: DVT Prophylaxis Xarelto





* .








Review of Systems





- Review of Systems


Eyes: negative: Pain, Vision Change, Conjunctivae Inflammation, Eyelid 

Inflammation, Redness, Other


ENT: negative: Ear Pain, Ear Discharge, Nose Pain, Nose Discharge, Nose 

Congestion, Mouth Pain, Mouth Swelling, Throat Pain, Throat Swelling, Other


Respiratory: negative: Cough, Dry, Shortness of Breath, Hemoptysis, SOB with 

Excertion, Pleuritic Pain, Sputum, Wheezing


Gastrointestinal: negative: Nausea, Vomiting, Abdominal Pain, Diarrhea, 

Constipation, Melena, Hematochezia, Other





- Medications/Allergies


Allergies/Adverse Reactions: 


 Allergies











Allergy/AdvReac Type Severity Reaction Status Date / Time


 


iodine Allergy   Verified 12/26/17 23:06


 


levofloxacin [From Levaquin] Allergy   Verified 12/20/17 23:44


 


penicillin G Allergy   Verified 12/26/17 23:06


 


codeine AdvReac   Verified 12/26/17 23:06


 


diazepam [From Valium] AdvReac   Verified 12/26/17 23:06











Medications: 


 Current Medications





Acetaminophen (Tylenol)  500 mg PO Q6H PRN


   PRN Reason: Mild Pain (1-3)


   Last Admin: 12/27/17 16:08 Dose:  500 mg


Al Hydroxide/Mg Hydroxide (Maalox)  30 ml PO Q8H PRN


   PRN Reason: Indigestion


Ascorbic Acid (Vitamin C)  500 mg PO BID [facility]


   Last Admin: 12/28/17 09:13 Dose:  500 mg


Aspirin (Ecotrin)  81 mg PO QAM [facility]


   Last Admin: 12/28/17 09:13 Dose:  81 mg


Atorvastatin Calcium (Lipitor)  10 mg PO [facility]


   Last Admin: 12/27/17 21:02 Dose:  10 mg


Baclofen (Lioresal)  10 mg PO TID PRN


   PRN Reason: muscle spasms


Calcium/Vitamin D (Caltrate 600 + Vit D)  1 tab PO DAILY [facility]


   Last Admin: 12/28/17 09:13 Dose:  1 tab


Dextromethorphan Polistirix (Delsym 30 Mg/5 Ml 89 Ml Bot)  60 mg PO Q12H PRN


   PRN Reason: Cough


Donepezil HCl (Aricept)  10 mg PO [facility]


   Last Admin: 12/27/17 21:02 Dose:  10 mg


Duloxetine HCl (Cymbalta)  60 mg PO QPM [facility]


   Last Admin: 12/27/17 21:02 Dose:  60 mg


Famotidine (Pepcid)  20 mg PO BID [facility]


   Last Admin: 12/28/17 09:13 Dose:  20 mg


Furosemide (Lasix)  40 mg PO QAM [facility]


   Last Admin: 12/28/17 09:13 Dose:  40 mg


Gabapentin (Neurontin)  400 mg PO TID [facility]


   Last Admin: 12/28/17 14:27 Dose:  400 mg


Sodium Chloride (Normal Saline 0.9%)  1,000 mls @ 70 mls/hr IV .[id number] [facility]


   Last Admin: 12/28/17 03:07 Dose:  1,000 mls


Insulin Detemir 25 units/ (Miscellaneous Medication)  0.25 mls @ 0 mls/hr SC 

BID [facility]


   Last Admin: 12/28/17 11:59 Dose:  0.25 mls


Iron/Minerals/Multivitamins (Theragran M)  1 tab PO DAILY [facility]


   Last Admin: 12/28/17 09:14 Dose:  1 tab


Isosorbide Mononitrate (Imdur Er)  [facility] [place] [place]


   Last Admin: 12/28/17 09:14 Dose:  30 mg


Losartan Potassium (Cozaar)  50 [facility] [place] [place]


   Last Admin: 12/28/17 09:14 Dose:  50 mg


Metformin HCl (Glucophage)  1,000 mg PO BID[facility]


   Last Admin: 12/28/17 11:59 Dose:  1,000 mg


Ondansetron HCl (Zofran Odt)  4 mg PO Q6H PRN


   PRN Reason: Nausea/Vomiting


Pantoprazole Sodium (Protonix)  40 mg PO DAILY [facility]


   Last Admin: 12/28/17 09:14 Dose:  40 mg


Psyllium Hydrophilic Mucilloid (Metamucil)  1 pk PO DAILYPRN PRN


   PRN Reason: Constipation


Rivaroxaban (Xarelto)  10 mg PO DAILY [facility]


   Last Admin: 12/28/17 09:14 Dose:  10 mg


Tramadol HCl (Ultram)  50 mg PO Q6H PRN


   PRN Reason: Moderate to Severe Pain (6-10)


Tramadol HCl (Ultram)  50 mg PO [facility]


   Last Admin: 12/28/17 09:14 Dose:  50 mg

## 2017-12-29 RX ADMIN — Medication SCH MG: at 20:23

## 2017-12-29 RX ADMIN — MULTIPLE VITAMINS W/ MINERALS TAB SCH TAB: TAB at 08:08

## 2017-12-29 RX ADMIN — Medication SCH MG: at 08:07

## 2017-12-29 RX ADMIN — ASPIRIN SCH MG: 81 TABLET ORAL at 08:07

## 2017-12-29 RX ADMIN — Medication SCH TAB: at 08:07

## 2017-12-29 NOTE — CON
DATE OF CONSULTATION:  12/28/2017

 

PRIMARY CARDIOLOGIST:  Dr. Samantha Bolanos.

 

REASON FOR CONSULTATION:  Bradycardia and dizziness.

 

HISTORY OF PRESENT ILLNESS:  Ms. Kidd is a very pleasant 88-year-old woman.  She was brought to [facility] after she was found to be weak and fatigued and lightheaded.  She was found to have a low 
heart rates, she was admitted on 12/26/2017.  She had a pulse initially 50 and blood pressure is 98/5
7.  No chest pain or pressure.  Since she has been here, they have held the digoxin and heart rates h
ave increased.

 

PAST MEDICAL HISTORY:

1.  Atrial fibrillation, chronic.

2.  Hyperlipidemia.

3.  Hypertension.

 

PAST SURGICAL HISTORY:  Cholecystectomy and mastectomy.

 

PSYCHIATRIC HISTORY:  Depression.

 

SOCIAL HISTORY:  No alcohol or tobacco.  She lives in a nursing home.

 

MEDICATIONS:  She was taking digoxin 0.125 mg a day and metoprolol 12.5 mg twice a day and losartan.

 

REVIEW OF SYSTEMS:  Constitutional:  No significant weight gain or loss.  Vision:  No changes.  Heari
ng:  No changes.  Pulmonary:  No cough or wheezing.  Gastrointestinal:  No nausea, vomiting, diarrhea
.  Skin:  No rashes.  Neurologic:  No unilateral weakness or numbness.  Psychiatric:  No unusual depr
ession or anxiety.  Hematologic:  No unusual bruising.  Genitourinary:  No burning with urination.

 

PHYSICAL EXAMINATION:

GENERAL:  A pleasant elderly woman in no distress.

VITAL SIGNS:  Her blood pressure is 99/56, pulse in the 50 to 60 irregular.

HEENT:  Eyes:  Sclerae nonicteric.  Mouth:  Mucous membranes moist.

NECK:  Supple, no lymphadenopathy.

LUNGS:  Clear, no wheezing, rales or rhonchi.

CARDIAC:  Irregularly irregular.

ABDOMEN:  Soft, nontender.

EXTREMITIES:  There is no edema.

 

LABORATORY AND X-RAY FINDINGS:  Digoxin level is 1.1.  EKG initially showed atrial fibrillation with 
slow rates now the rates are back to normal.

 

ASSESSMENT:  Atrial fibrillation with a slow ventricular response, improved after digoxin being held.


 

PLAN:

1.  Continue off digoxin.

2.  She is also off metoprolol.

3.  On Tuesday maintained on Xarelto, which is her home dose.  Dr. Bolanos to see the patient tomorrow.

## 2017-12-29 NOTE — PDOC.PN
- Subjective


Encounter Start Date: 12/29/17


Encounter Start Time: 16:18


Subjective: Pt seen and examined for Bradycardia


-: Pt feels better , denies any CP, SOB





- Objective


Resuscitation Status: 


 











Resuscitation Status           DNR:Do Not Resuscitate














MAR Reviewed: Yes


Vital Signs & Weight: 


 Vital Signs (12 hours)











  Temp Pulse Resp BP Pulse Ox


 


 12/29/17 11:55  97.6 F  60  22 H  121/61  95


 


 12/29/17 08:00  98.1 F  73  18  140/67  94 L


 


 12/29/17 04:21  97.8 F  63  20  116/58 L  95








 Weight











Weight                         175 lb 12.8 oz














I&O: 


 











 12/28/17 12/29/17 12/30/17





 06:59 06:59 06:59


 


Intake Total 2463 2467 


 


Balance 2463 2467 











Result Diagrams: 


 12/26/17 15:50





 12/26/17 15:50


Additional Labs: 


 Accuchecks











  12/29/17 12/29/17 12/28/17





  10:45 06:02 20:19


 


POC Glucose  178 H  92  164 H














  12/28/17





  16:21


 


POC Glucose  195 H














Phys Exam





- Physical Examination


HEENT: PERRLA, moist MMs, sclera anicteric, TM's clear, oral pharynx no lesions

, 2+ tonsils


Neck: no nodes, no JVD, supple, full ROM


Respiratory: no wheezing, no rales, no rhonchi, wheezing present, clear to 

auscultation bilateral


Cardiovascular: RRR, no significant murmur, no rub, gallop, irregular


Gastrointestinal: soft, non-tender, no distention, positive bowel sounds


Musculoskeletal: no edema, pulses present, edema present


Neurological: non-focal, normal sensation, moves all 4 limbs


Psychiatric: normal affect, A&O x 3


Skin: no rash, normal turgor, cap refill <2 seconds





Dx/Plan


(1) Bradycardia


Code(s): R00.1 - BRADYCARDIA, UNSPECIFIED   Status: Acute   





(2) Atrial fibrillation


Code(s): I48.91 - UNSPECIFIED ATRIAL FIBRILLATION   Status: Chronic   





- Plan


cont current plan of care, plan discussed w/ family, PT/OT


Sinus Bradycardia/SSS, off Digoxin, may need PPM as per Cadio


-: Continue to Monitor


-: PT consulted


-: Type 2 dM on Levemir and sliding scale


-: DVT Prophylaxis on Xarelto





* .








Review of Systems





- Review of Systems


ENT: negative: Ear Pain, Ear Discharge, Nose Pain, Nose Discharge, Nose 

Congestion, Mouth Pain, Mouth Swelling, Throat Pain, Throat Swelling, Other


Respiratory: negative: Cough, Dry, Shortness of Breath, Hemoptysis, SOB with 

Excertion, Pleuritic Pain, Sputum, Wheezing


Cardiovascular: negative: chest pain, palpitations, orthopnea, paroxysmal 

nocturnal dyspnea, edema, light headedness, other


Gastrointestinal: negative: Nausea, Vomiting, Abdominal Pain, Diarrhea, 

Constipation, Melena, Hematochezia, Other





- Medications/Allergies


Allergies/Adverse Reactions: 


 Allergies











Allergy/AdvReac Type Severity Reaction Status Date / Time


 


iodine Allergy   Verified 12/26/17 23:06


 


levofloxacin [From Levaquin] Allergy   Verified 12/20/17 23:44


 


penicillin G Allergy   Verified 12/26/17 23:06


 


codeine AdvReac   Verified 12/26/17 23:06


 


diazepam [From Valium] AdvReac   Verified 12/26/17 23:06











Medications: 


 Current Medications





Acetaminophen (Tylenol)  500 mg PO Q6H PRN


   PRN Reason: Mild Pain (1-3)


   Last Admin: 12/27/17 16:08 Dose:  500 mg


Al Hydroxide/Mg Hydroxide (Maalox)  30 ml PO Q8H PRN


   PRN Reason: Indigestion


Ascorbic Acid (Vitamin C)  500 mg PO BID [facility]


   Last Admin: 12/29/17 08:07 Dose:  500 mg


Aspirin (Ecotrin)  81 mg PO QAM [facility]


   Last Admin: 12/29/17 08:07 Dose:  81 mg


Atorvastatin Calcium (Lipitor)  10 mg PO [facility]


   Last Admin: 12/28/17 21:09 Dose:  10 mg


Baclofen (Lioresal)  10 mg PO TID PRN


   PRN Reason: muscle spasms


Calcium/Vitamin D (Caltrate 600 + Vit D)  1 tab PO DAILY [facility]


   Last Admin: 12/29/17 08:07 Dose:  1 tab


Dextromethorphan Polistirix (Delsym 30 Mg/5 Ml 89 Ml Bot)  60 mg PO Q12H PRN


   PRN Reason: Cough


Donepezil HCl (Aricept)  10 mg PO HS [facility]


   Last Admin: 12/28/17 21:08 Dose:  10 mg


Duloxetine HCl (Cymbalta)  60 mg PO QPM [facility]


   Last Admin: 12/28/17 21:08 Dose:  60 mg


Furosemide (Lasix)  40 mg PO QAM [facility]


   Last Admin: 12/29/17 08:08 Dose:  40 mg


Gabapentin (Neurontin)  400 mg PO TID [facility]


   Last Admin: 12/29/17 08:08 Dose:  400 mg


Sodium Chloride (Normal Saline 0.9%)  1,000 mls @ 70 mls/hr IV .[id number] [facility]


   Last Admin: 12/29/17 05:55 Dose:  1,000 mls


Insulin Detemir 25 units/ (Miscellaneous Medication)  0.25 mls @ 0 mls/hr SC 

BID [facility]


   Last Admin: 12/29/17 08:18 Dose:  Not Given


Iron/Minerals/Multivitamins (Theragran M)  1 tab PO DAILY [facility]


   Last Admin: 12/29/17 08:08 Dose:  1 tab


Isosorbide Mononitrate (Imdur Er)  30 mg PO [facility]


   Last Admin: 12/29/17 08:08 Dose:  30 mg


Losartan Potassium (Cozaar)  50 mg PO [facility]


   Last Admin: 12/29/17 08:16 Dose:  50 mg


Metformin HCl (Glucophage)  1,000 mg PO BID[facility]


   Last Admin: 12/29/17 08:07 Dose:  1,000 mg


Ondansetron HCl (Zofran Odt)  4 mg PO Q6H PRN


   PRN Reason: Nausea/Vomiting


Pantoprazole Sodium (Protonix)  40 mg PO DAILY [facility]


   Last Admin: 12/29/17 08:08 Dose:  40 mg


Psyllium Hydrophilic Mucilloid (Metamucil)  1 pk PO DAILYPRN PRN


   PRN Reason: Constipation


Rivaroxaban (Xarelto)  10 mg PO DAILY [facility]


   Last Admin: 12/29/17 08:07 Dose:  10 mg


Tramadol HCl (Ultram)  50 mg PO Q6H PRN


   PRN Reason: Moderate to Severe Pain (6-10)


Tramadol HCl (Ultram)  50 mg PO [facility]


   Last Admin: 12/29/17 08:09 Dose:  50 mg

## 2017-12-29 NOTE — PDOC.CTH
<Balbina Gregory - Last Filed: 12/29/17 14:40>





Cardiology Progress Note





- Subjective





The pt seen and examined.  No overnight events.  No cardiac complaints.  She 

walks with 1 assist





- Objective


 Vital Signs











  Temp Pulse Resp BP Pulse Ox


 


 12/29/17 11:55  97.6 F  60  22 H  121/61  95


 


 12/29/17 08:00  98.1 F  73  18  140/67  94 L


 


 12/29/17 04:21  97.8 F  63  20  116/58 L  95








 











Weight                         175 lb 12.8 oz














 











 12/28/17 12/29/17 12/30/17





 06:59 06:59 06:59


 


Intake Total 2463 2467 


 


Balance 2463 2467 














- Physical Examination


General/Neuro: alert & oriented x3


Neck: no JVD present


Lungs: CTA


Heart: other: (irregular)


Abdomen: soft


Extremities: other: (No edema)





- Telemetry


Telemetry Rhythm: Afib 50-80s





- Labs


Result Diagrams: 


 12/26/17 15:50





 12/26/17 15:50


 Troponin/CKMB











CK-MB (CK-2)  1.2 ng/mL (0-6.6)   12/26/17  15:50    


 


Troponin I  0.025 ng/mL (< 0.028)   12/28/17  04:40    














- Assessment/Plan





1. Bradycardia - Remain AFib with HR 50-80s; possible PM placement for SSS?  


2. Chronic Afib - Afib with HR 50-80s with Xarelto; cont. monitor on tele


3. HTN - Stable with current medication 


4. Hyperlipidemia - On statin


5. DM type 2 - on ACHS BG with SS Insulin


MAR reviewed








Review of Systems





- Review of Systems


Constitutional: reports: no symptoms reported


EENTM: reports: no symptoms reported


Respiratory: reports: no symptoms reported


Cardiac (ROS): reports: no symptoms reported


ABD/GI: reports: no symptoms reported


: reports: no symptoms reported


Musculoskeletal: reports: no symptoms reported


Skin: reports: no symptoms reported





<VINCENT Bolanos - Last Filed: 12/29/17 16:24>





Cardiology Progress Note





- Objective


 Vital Signs











  Temp Pulse Resp BP Pulse Ox


 


 12/29/17 11:55  97.6 F  60  22 H  121/61  95


 


 12/29/17 08:00  98.1 F  73  18  140/67  94 L








 











Weight                         175 lb 12.8 oz














 











 12/28/17 12/29/17 12/30/17





 06:59 06:59 06:59


 


Intake Total 2463 2467 


 


Balance 2463 2467 














- Labs


Result Diagrams: 


 12/26/17 15:50





 12/26/17 15:50


 Troponin/CKMB











CK-MB (CK-2)  1.2 ng/mL (0-6.6)   12/26/17  15:50    


 


Troponin I  0.025 ng/mL (< 0.028)   12/28/17  04:40    














- Assessment/Plan





Pt. seen and eval. by me. The HR is reasonable off the digoxin. No indication 

at this time for a pacemaker. She may eventually need one but at this time it 

is prob. best to follow the HR off digoxin. If the HR increases and she 

requires negative chronotropes then she will need a pacemaker in order to 

control the bradycardia.


 I agree with the A/P by thr NP.

## 2017-12-30 VITALS — SYSTOLIC BLOOD PRESSURE: 116 MMHG | DIASTOLIC BLOOD PRESSURE: 60 MMHG | TEMPERATURE: 97.8 F

## 2017-12-30 RX ADMIN — Medication SCH MG: at 09:08

## 2017-12-30 RX ADMIN — MULTIPLE VITAMINS W/ MINERALS TAB SCH TAB: TAB at 09:08

## 2017-12-30 RX ADMIN — Medication SCH TAB: at 09:09

## 2017-12-30 RX ADMIN — ASPIRIN SCH MG: 81 TABLET ORAL at 09:09

## 2017-12-30 NOTE — PDOC.PN
- Subjective


Encounter Start Date: 12/30/17


Encounter Start Time: 08:30


Subjective: No complaint this morning. No chest pain. No SOB.





- Objective


Resuscitation Status: 


 











Resuscitation Status           DNR:Do Not Resuscitate














MAR Reviewed: Yes


Vital Signs & Weight: 


 Vital Signs (12 hours)











  Temp Pulse Resp BP Pulse Ox


 


 12/30/17 04:45  97.6 F  63  16  122/61  95


 


 12/30/17 00:15   63  18  119/59 L  94 L








 Weight











Weight                         176 lb 4.8 oz














I&O: 


 











 12/29/17 12/30/17 12/31/17





 06:59 06:59 06:59


 


Intake Total 2467 2060 


 


Balance 2467 2060 











Result Diagrams: 


 12/26/17 15:50





 12/26/17 15:50


Additional Labs: 


 Accuchecks











  12/30/17 12/29/17 12/29/17





  05:57 20:43 16:45


 


POC Glucose  175 H  186 H  156 H














  12/29/17





  10:45


 


POC Glucose  178 H














Phys Exam





- Physical Examination


Constitutional: NAD


HEENT: moist MMs


Respiratory: no wheezing, no rales, no rhonchi


Cardiovascular: RRR


Gastrointestinal: soft, positive bowel sounds


Neurological: non-focal, moves all 4 limbs


Psychiatric: normal affect





Dx/Plan


(1) Bradycardia


Code(s): R00.1 - BRADYCARDIA, UNSPECIFIED   Status: Acute   





(2) Permanent atrial fibrillation


Code(s): I48.2 - CHRONIC ATRIAL FIBRILLATION   Status: Chronic   Comment: On 

Xarelto   





(3) Hypertension


Code(s): I10 - ESSENTIAL (PRIMARY) HYPERTENSION   Status: Chronic   Comment: 

controlled   





(4) Diabetes mellitus type 2 in nonobese


Code(s): E11.9 - TYPE 2 DIABETES MELLITUS WITHOUT COMPLICATIONS   Status: 

Chronic   Comment: controlled   





(5) Hyperlipidemia


Code(s): E78.5 - HYPERLIPIDEMIA, UNSPECIFIED   Status: Chronic   Comment: on 

statin   





- Plan


cont current plan of care, PT/OT


Return to nursing home today





* .








- Discharge Day


Encounter end time: 09:00

## 2017-12-30 NOTE — DIS
PRIMARY CARE PHYSICIAN:  Ceci Harris D.O.

 

DIAGNOSES ON ADMISSION:

1.  Bradycardia, symptomatic.

2.  Atrial fibrillation, chronic.

3.  Hyperlipidemia.

4.  Hypertension.

5.  Diabetes mellitus type 2, insulin-dependent.

6.  Injured right lower extremity with possible deep venous thrombosis.

7.  Lactic acidosis.

 

DIAGNOSES ON DISCHARGE:

1.  Bradycardia, resolved.

2.  Atrial fibrillation, chronic.

3.  Hypertension.

4.  Hyperlipidemia.

5.  Diabetes mellitus type 2, insulin-dependent.

6.  Right lower extremity contusion with hematoma.

7.  Lactic acidosis likely metformin affect.

 

PROCEDURES:  Ultrasound of the right lower extremity showing no evidence for DVT.

 

CONSULTATIONS:  Cardiology, Dr. Garnica.

 

HOSPITAL COURSE:  This is an 88-year-old white female with a known history of atrial fibrillation, ra
te controlled with metoprolol and digoxin.  She presented to the hospital after being found weak, fat
igued and lightheaded.  She was found to have heart rate in the 50s and blood pressure in the 90s sys
tolic.  No other symptoms.  Her digoxin and metoprolol were held in the hospital, and her heart rate 
returned into the 60s and she was asymptomatic.  The patient also had some swelling of the right lowe
r extremity noted.  Ultrasound was done in the emergency room which showed no evidence of DVT.  She d
oes have a reddish contusion appearing lesion on the right shin and then again further down around th
e ankle.  No evidence of spreading cellulitis.  Mild tenderness to palpation, no warmth.  It is thoug
ht to be due to injury.  Patient does not remember being _____ but thinks she must have banged it on 
something.  After returning of heart rates to normal, the patient was cleared for discharge home.  Sh
aura had persistent lactic acidosis without any other symptoms from the hospital, this was thought possi
fermin due to her metformin she is already on insulin.  We will go ahead and just discontinue that.

 

DISCHARGE MANAGEMENT:  Discharged back to [facility].

 

FOLLOWUP:   Follow up with Dr. Ceci Harris in 1 week.

 

ACTIVITY:  As tolerated.

 

DIET:  Diabetic, low sodium diet.  Occupational and physical therapy.

 

DISCHARGE MEDICATIONS:  The patient is to stop metformin, metoprolol, and digoxin.  She is to continu
e all other home medications.

1.  Metamucil 1 packet daily as needed.

2.  Baclofen 10 mg 3 times a day as needed.

3.  Omeprazole 20 mg daily.

4.  Vitamin C 500 mg twice a day.

5.  Centrum Silver 1 tablet daily.

6.  Pepcid 20 mg twice a day.

7.  Maalox 30 mL every 8 hours as needed.

8.  Tussin Cough for children and adults 10 mL every 4 hours as needed.

9.  Cymbalta 60 mg at night.

10.  Isosorbide mononitrate extended release 30 mg daily.

11.  Lantus 20 units subcu twice a day.

12.  Gabapentin 400 mg 3 times a day.

13.  Furosemide 20 mg daily.

14.  Hiprex 1 gram p.o. twice a day.

15.  Losartan 50 mg daily.

16.  Cranberry 450 mg twice a day.

17.  Calcium 600 with vitamin D3 of 400 caplet one each day.

18.  Aspirin 81 mg daily.

19.  Xarelto 10 mg daily.

20.  Zofran ODT 4 mg every 6 hours as needed.

21.  Simvastatin 20 mg at night.

22.  Tramadol 50 mg each morning and then every 6 hours as needed.

23.  Donepezil 10 mg at night.

24.  Acetaminophen 500 mg every 6 hours as needed.

## 2018-01-08 ENCOUNTER — HOSPITAL ENCOUNTER (EMERGENCY)
Dept: HOSPITAL 57 - BURERS | Age: 83
Discharge: NURSING, REHAB OR LONG-TERM CARE | End: 2018-01-08
Payer: MEDICARE

## 2018-01-08 DIAGNOSIS — E11.9: ICD-10-CM

## 2018-01-08 DIAGNOSIS — K21.9: ICD-10-CM

## 2018-01-08 DIAGNOSIS — I10: ICD-10-CM

## 2018-01-08 DIAGNOSIS — Z79.4: ICD-10-CM

## 2018-01-08 DIAGNOSIS — I48.91: ICD-10-CM

## 2018-01-08 DIAGNOSIS — Z85.3: ICD-10-CM

## 2018-01-08 DIAGNOSIS — N39.0: Primary | ICD-10-CM

## 2018-01-08 DIAGNOSIS — S80.10XA: ICD-10-CM

## 2018-01-08 DIAGNOSIS — S40.029A: ICD-10-CM

## 2018-01-08 DIAGNOSIS — Z92.3: ICD-10-CM

## 2018-01-08 DIAGNOSIS — E66.9: ICD-10-CM
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
## 2018-01-08 DIAGNOSIS — F32.9: ICD-10-CM

## 2018-01-08 DIAGNOSIS — E78.5: ICD-10-CM

## 2018-01-08 LAB
ALBUMIN SERPL BCG-MCNC: 3.2 G/DL (ref 3.4–4.8)
ALP SERPL-CCNC: 112 U/L (ref 40–150)
ALT SERPL W P-5'-P-CCNC: 13 U/L (ref 8–55)
ANION GAP SERPL CALC-SCNC: 14 MMOL/L (ref 10–20)
AST SERPL-CCNC: 15 U/L (ref 5–34)
BACTERIA UR QL AUTO: (no result) HPF
BASOPHILS # BLD AUTO: 0 THOU/UL (ref 0–0.2)
BASOPHILS NFR BLD AUTO: 0.4 % (ref 0–1)
BILIRUB SERPL-MCNC: 0.4 MG/DL (ref 0.2–1.2)
BUN SERPL-MCNC: 15 MG/DL (ref 9.8–20.1)
CALCIUM SERPL-MCNC: 9.3 MG/DL (ref 7.8–10.44)
CHLORIDE SERPL-SCNC: 104 MMOL/L (ref 98–107)
CK SERPL-CCNC: 11 U/L (ref 29–168)
CO2 SERPL-SCNC: 32 MMOL/L (ref 23–31)
CREAT CL PREDICTED SERPL C-G-VRATE: 0 ML/MIN (ref 70–130)
EOSINOPHIL # BLD AUTO: 0.3 THOU/UL (ref 0–0.7)
EOSINOPHIL NFR BLD AUTO: 3.6 % (ref 0–10)
GLOBULIN SER CALC-MCNC: 2.8 G/DL (ref 2.4–3.5)
GLUCOSE SERPL-MCNC: 98 MG/DL (ref 83–110)
HGB BLD-MCNC: 12.1 G/DL (ref 12–16)
LYMPHOCYTES # BLD AUTO: 2.7 THOU/UL (ref 1.2–3.4)
LYMPHOCYTES NFR BLD AUTO: 36.4 % (ref 21–51)
MCH RBC QN AUTO: 29 PG (ref 27–31)
MCV RBC AUTO: 88.4 FL (ref 81–99)
MONOCYTES # BLD AUTO: 0.7 THOU/UL (ref 0.11–0.59)
MONOCYTES NFR BLD AUTO: 9.4 % (ref 0–10)
NEUTROPHILS # BLD AUTO: 3.7 THOU/UL (ref 1.4–6.5)
NEUTROPHILS NFR BLD AUTO: 50.1 % (ref 42–75)
PLATELET # BLD AUTO: 258 THOU/UL (ref 130–400)
POTASSIUM SERPL-SCNC: 4.3 MMOL/L (ref 3.5–5.1)
RBC # BLD AUTO: 4.19 MILL/UL (ref 4.2–5.4)
RBC UR QL AUTO: (no result) HPF (ref 0–3)
SODIUM SERPL-SCNC: 146 MMOL/L (ref 136–145)
SP GR UR STRIP: 1.01 (ref 1–1.03)
WBC # BLD AUTO: 7.4 THOU/UL (ref 4.8–10.8)
WBC UR QL AUTO: (no result) HPF (ref 0–3)

## 2018-01-08 PROCEDURE — 87086 URINE CULTURE/COLONY COUNT: CPT

## 2018-01-08 PROCEDURE — 36415 COLL VENOUS BLD VENIPUNCTURE: CPT

## 2018-01-08 PROCEDURE — 81003 URINALYSIS AUTO W/O SCOPE: CPT

## 2018-01-08 PROCEDURE — 93005 ELECTROCARDIOGRAM TRACING: CPT

## 2018-01-08 PROCEDURE — 87186 SC STD MICRODIL/AGAR DIL: CPT

## 2018-01-08 PROCEDURE — 87077 CULTURE AEROBIC IDENTIFY: CPT

## 2018-01-08 PROCEDURE — 51701 INSERT BLADDER CATHETER: CPT

## 2018-01-08 PROCEDURE — 85025 COMPLETE CBC W/AUTO DIFF WBC: CPT

## 2018-01-08 PROCEDURE — 80053 COMPREHEN METABOLIC PANEL: CPT

## 2018-01-08 PROCEDURE — 96374 THER/PROPH/DIAG INJ IV PUSH: CPT

## 2018-01-08 PROCEDURE — 72170 X-RAY EXAM OF PELVIS: CPT

## 2018-01-08 PROCEDURE — 81015 MICROSCOPIC EXAM OF URINE: CPT

## 2018-01-08 PROCEDURE — 82550 ASSAY OF CK (CPK): CPT

## 2018-01-08 NOTE — RAD
PELVIS ONE VIEW

1/8/18

 

Comparison is made with the 12/20/17 study. 

 

There still does not appear to be any definite fracture of the bony pelvis or hips. The SI joints wer
e symmetrical. The hip joints were equal in width. The symphysis shows no widening or offset. If pain
 persists, it may be worth doing a CT or MRI of the region as this will sometimes show occult injurie
s that do not show up on plain radiographs. As before, a considerable amount of fecal material is see
n in the left colon. 

 

IMPRESSION:  

No acute findings. 

 

POS: HOME

## 2018-01-08 NOTE — RAD
LEFT HIP TWO VIEWS

1/8/18

 

No fracture was seen. The hip appears intact and the hip joint was unremarkable. The adjacent pubic r
ing appears intact. 

 

IMPRESSION:  

No significant findings. 

 

POS: HOME

## 2018-01-19 NOTE — EKG
Test Reason : 

Blood Pressure : ***/*** mmHG

Vent. Rate : 049 BPM     Atrial Rate : 049 BPM

   P-R Int : 126 ms          QRS Dur : 094 ms

    QT Int : 424 ms       P-R-T Axes : 000 -04 -19 degrees

   QTc Int : 383 ms

 

Junctional bracycardia

Cannot rule out Inferior infarct , age undetermined

Abnormal ECG

 

Confirmed by ONEIL YOUNG, MARYLU ([id number]),  EDGARD CONSTANTINO ([id number]) on 1/19/2018 2:21:02 PM

 

Referred By:             Confirmed By:MARYLU RIGGS MD

## 2018-05-18 ENCOUNTER — HOSPITAL ENCOUNTER (EMERGENCY)
Dept: HOSPITAL 92 - ERS | Age: 83
Discharge: HOME | End: 2018-05-18
Payer: MEDICARE

## 2018-05-18 DIAGNOSIS — E78.5: ICD-10-CM

## 2018-05-18 DIAGNOSIS — N39.0: ICD-10-CM

## 2018-05-18 DIAGNOSIS — Z85.3: ICD-10-CM

## 2018-05-18 DIAGNOSIS — M79.2: Primary | ICD-10-CM

## 2018-05-18 DIAGNOSIS — F32.9: ICD-10-CM

## 2018-05-18 DIAGNOSIS — E11.9: ICD-10-CM

## 2018-05-18 DIAGNOSIS — I48.91: ICD-10-CM

## 2018-05-18 DIAGNOSIS — K21.9: ICD-10-CM

## 2018-05-18 DIAGNOSIS — Z79.4: ICD-10-CM

## 2018-05-18 DIAGNOSIS — G89.18: ICD-10-CM

## 2018-05-18 DIAGNOSIS — I10: ICD-10-CM

## 2018-05-18 DIAGNOSIS — E66.9: ICD-10-CM

## 2018-05-18 LAB
ALBUMIN SERPL BCG-MCNC: 3.4 G/DL (ref 3.4–4.8)
ALP SERPL-CCNC: 110 U/L (ref 40–150)
ALT SERPL W P-5'-P-CCNC: 15 U/L (ref 8–55)
ANION GAP SERPL CALC-SCNC: 10 MMOL/L (ref 10–20)
AST SERPL-CCNC: 13 U/L (ref 5–34)
BASOPHILS # BLD AUTO: 0 THOU/UL (ref 0–0.2)
BASOPHILS NFR BLD AUTO: 0 % (ref 0–1)
BILIRUB SERPL-MCNC: 0.3 MG/DL (ref 0.2–1.2)
BUN SERPL-MCNC: 31 MG/DL (ref 9.8–20.1)
CALCIUM SERPL-MCNC: 8.8 MG/DL (ref 7.8–10.44)
CHLORIDE SERPL-SCNC: 103 MMOL/L (ref 98–107)
CK MB SERPL-MCNC: 1.6 NG/ML (ref 0–6.6)
CK SERPL-CCNC: 27 U/L (ref 29–168)
CO2 SERPL-SCNC: 26 MMOL/L (ref 23–31)
CREAT CL PREDICTED SERPL C-G-VRATE: 0 ML/MIN (ref 70–130)
EOSINOPHIL # BLD AUTO: 0.1 THOU/UL (ref 0–0.7)
EOSINOPHIL NFR BLD AUTO: 0.8 % (ref 0–10)
GLOBULIN SER CALC-MCNC: 2.9 G/DL (ref 2.4–3.5)
GLUCOSE SERPL-MCNC: 224 MG/DL (ref 83–110)
HGB BLD-MCNC: 13.5 G/DL (ref 12–16)
LIPASE SERPL-CCNC: 43 U/L (ref 8–78)
LYMPHOCYTES # BLD: 1.7 THOU/UL (ref 1.2–3.4)
LYMPHOCYTES NFR BLD AUTO: 19.4 % (ref 21–51)
MCH RBC QN AUTO: 29.5 PG (ref 27–31)
MCV RBC AUTO: 90.9 FL (ref 81–99)
MONOCYTES # BLD AUTO: 0.7 THOU/UL (ref 0.11–0.59)
MONOCYTES NFR BLD AUTO: 7.5 % (ref 0–10)
NEUTROPHILS # BLD AUTO: 6.4 THOU/UL (ref 1.4–6.5)
NEUTROPHILS NFR BLD AUTO: 72.3 % (ref 42–75)
PLATELET # BLD AUTO: 217 THOU/UL (ref 130–400)
POTASSIUM SERPL-SCNC: 4.2 MMOL/L (ref 3.5–5.1)
RBC # BLD AUTO: 4.58 MILL/UL (ref 4.2–5.4)
SODIUM SERPL-SCNC: 135 MMOL/L (ref 136–145)
TROPONIN I SERPL DL<=0.01 NG/ML-MCNC: (no result) NG/ML (ref ?–0.03)
WBC # BLD AUTO: 8.9 THOU/UL (ref 4.8–10.8)

## 2018-05-18 PROCEDURE — 83690 ASSAY OF LIPASE: CPT

## 2018-05-18 PROCEDURE — 84484 ASSAY OF TROPONIN QUANT: CPT

## 2018-05-18 PROCEDURE — 36415 COLL VENOUS BLD VENIPUNCTURE: CPT

## 2018-05-18 PROCEDURE — 85379 FIBRIN DEGRADATION QUANT: CPT

## 2018-05-18 PROCEDURE — 71045 X-RAY EXAM CHEST 1 VIEW: CPT

## 2018-05-18 PROCEDURE — 93005 ELECTROCARDIOGRAM TRACING: CPT

## 2018-05-18 PROCEDURE — 85025 COMPLETE CBC W/AUTO DIFF WBC: CPT

## 2018-05-18 PROCEDURE — 80053 COMPREHEN METABOLIC PANEL: CPT

## 2018-05-18 PROCEDURE — 82553 CREATINE MB FRACTION: CPT

## 2018-05-18 PROCEDURE — 82550 ASSAY OF CK (CPK): CPT

## 2018-05-18 NOTE — RAD
PORTABLE AP CHEST X-RAY

5/18/18

 

HISTORY: 

Chest pain. 

 

Patient states pain is sharp in nature and shoots to the back. Pain with inspiration and lifting arm.
 

 

COMPARISON:  

12/26/17.

 

FINDINGS:  

Postsurgical changes related to left mastectomy are noted with multiple surgical clips overlying the 
left axillary region. The cardiac silhouette is magnified by projection. The pulmonary vasculature is
 within normal limits. Minimal linear scarring versus atelectasis is present at the left lung base. T
he lungs are otherwise clear. Vascular calcifications are seen in the thoracic aorta. There has been 
no significant interval change from the prior exam.

 

IMPRESSION:  

1.      No acute cardiopulmonary process.  

2.      Left mastectomy. 

 

 

 

POS: [facility]

## 2018-07-16 ENCOUNTER — HOSPITAL ENCOUNTER (EMERGENCY)
Dept: HOSPITAL 92 - ERS | Age: 83
Discharge: HOME | End: 2018-07-16
Payer: MEDICARE

## 2018-07-16 DIAGNOSIS — K21.9: ICD-10-CM

## 2018-07-16 DIAGNOSIS — Z79.4: ICD-10-CM

## 2018-07-16 DIAGNOSIS — I48.91: ICD-10-CM

## 2018-07-16 DIAGNOSIS — E66.9: ICD-10-CM

## 2018-07-16 DIAGNOSIS — Z79.82: ICD-10-CM

## 2018-07-16 DIAGNOSIS — Z79.899: ICD-10-CM

## 2018-07-16 DIAGNOSIS — I10: ICD-10-CM

## 2018-07-16 DIAGNOSIS — E11.9: ICD-10-CM

## 2018-07-16 DIAGNOSIS — F32.9: ICD-10-CM

## 2018-07-16 DIAGNOSIS — I49.9: ICD-10-CM

## 2018-07-16 DIAGNOSIS — N39.0: Primary | ICD-10-CM

## 2018-07-16 DIAGNOSIS — E78.5: ICD-10-CM

## 2018-07-16 LAB
ALBUMIN SERPL BCG-MCNC: 4.1 G/DL (ref 3.4–4.8)
ALP SERPL-CCNC: 130 U/L (ref 40–150)
ALT SERPL W P-5'-P-CCNC: 16 U/L (ref 8–55)
ANION GAP SERPL CALC-SCNC: 14 MMOL/L (ref 10–20)
AST SERPL-CCNC: 20 U/L (ref 5–34)
BASOPHILS # BLD AUTO: 0 THOU/UL (ref 0–0.2)
BASOPHILS NFR BLD AUTO: 0 % (ref 0–1)
BILIRUB SERPL-MCNC: 0.3 MG/DL (ref 0.2–1.2)
BUN SERPL-MCNC: 23 MG/DL (ref 9.8–20.1)
CALCIUM SERPL-MCNC: 10.2 MG/DL (ref 7.8–10.44)
CHLORIDE SERPL-SCNC: 100 MMOL/L (ref 98–107)
CK MB SERPL-MCNC: 2.4 NG/ML (ref 0–6.6)
CO2 SERPL-SCNC: 31 MMOL/L (ref 23–31)
CREAT CL PREDICTED SERPL C-G-VRATE: 0 ML/MIN (ref 70–130)
CRYSTAL-AUWI FLAG: 0 (ref 0–15)
EOSINOPHIL # BLD AUTO: 0.1 THOU/UL (ref 0–0.7)
EOSINOPHIL NFR BLD AUTO: 0.7 % (ref 0–10)
GLOBULIN SER CALC-MCNC: 3.6 G/DL (ref 2.4–3.5)
GLUCOSE SERPL-MCNC: 113 MG/DL (ref 83–110)
HEV IGM SER QL: 17.1 (ref 0–7.99)
HGB BLD-MCNC: 15.8 G/DL (ref 12–16)
HYALINE CASTS #/AREA URNS LPF: (no result) LPF
LYMPHOCYTES # BLD: 1.3 THOU/UL (ref 1.2–3.4)
LYMPHOCYTES NFR BLD AUTO: 11.5 % (ref 21–51)
MCH RBC QN AUTO: 30.4 PG (ref 27–31)
MCV RBC AUTO: 91.1 FL (ref 78–98)
MONOCYTES # BLD AUTO: 0.5 THOU/UL (ref 0.11–0.59)
MONOCYTES NFR BLD AUTO: 4.3 % (ref 0–10)
NEUTROPHILS # BLD AUTO: 9.5 THOU/UL (ref 1.4–6.5)
NEUTROPHILS NFR BLD AUTO: 83.5 % (ref 42–75)
PATHC CAST-AUWI FLAG: 0.72 (ref 0–2.49)
PLATELET # BLD AUTO: 237 THOU/UL (ref 130–400)
POTASSIUM SERPL-SCNC: 4.4 MMOL/L (ref 3.5–5.1)
RBC # BLD AUTO: 5.2 MILL/UL (ref 4.2–5.4)
RBC UR QL AUTO: (no result) HPF (ref 0–3)
SODIUM SERPL-SCNC: 141 MMOL/L (ref 136–145)
SP GR UR STRIP: 1.01 (ref 1–1.04)
SPERM-AUWI FLAG: 0 (ref 0–9.9)
TROPONIN I SERPL DL<=0.01 NG/ML-MCNC: (no result) NG/ML (ref ?–0.03)
WBC # BLD AUTO: 11.3 THOU/UL (ref 4.8–10.8)
YEAST FLD HPF-#/AREA: (no result) HPF
YEAST-AUWI FLAG: 11.6 (ref 0–25)

## 2018-07-16 PROCEDURE — 84484 ASSAY OF TROPONIN QUANT: CPT

## 2018-07-16 PROCEDURE — A4353 INTERMITTENT URINARY CATH: HCPCS

## 2018-07-16 PROCEDURE — 80053 COMPREHEN METABOLIC PANEL: CPT

## 2018-07-16 PROCEDURE — 87086 URINE CULTURE/COLONY COUNT: CPT

## 2018-07-16 PROCEDURE — 93005 ELECTROCARDIOGRAM TRACING: CPT

## 2018-07-16 PROCEDURE — 81003 URINALYSIS AUTO W/O SCOPE: CPT

## 2018-07-16 PROCEDURE — 85025 COMPLETE CBC W/AUTO DIFF WBC: CPT

## 2018-07-16 PROCEDURE — 51701 INSERT BLADDER CATHETER: CPT

## 2018-07-16 PROCEDURE — 81015 MICROSCOPIC EXAM OF URINE: CPT

## 2018-07-16 PROCEDURE — 36415 COLL VENOUS BLD VENIPUNCTURE: CPT

## 2018-07-16 PROCEDURE — 82553 CREATINE MB FRACTION: CPT

## 2018-12-14 ENCOUNTER — HOSPITAL ENCOUNTER (INPATIENT)
Dept: HOSPITAL 92 - ERS | Age: 83
LOS: 10 days | Discharge: NURSING, REHAB OR LONG-TERM CARE | DRG: 757 | End: 2018-12-24
Attending: INTERNAL MEDICINE | Admitting: INTERNAL MEDICINE
Payer: MEDICARE

## 2018-12-14 VITALS — BODY MASS INDEX: 34.4 KG/M2

## 2018-12-14 DIAGNOSIS — Z79.01: ICD-10-CM

## 2018-12-14 DIAGNOSIS — E86.0: ICD-10-CM

## 2018-12-14 DIAGNOSIS — Z66: ICD-10-CM

## 2018-12-14 DIAGNOSIS — Z90.12: ICD-10-CM

## 2018-12-14 DIAGNOSIS — E44.1: ICD-10-CM

## 2018-12-14 DIAGNOSIS — E11.65: ICD-10-CM

## 2018-12-14 DIAGNOSIS — Z79.4: ICD-10-CM

## 2018-12-14 DIAGNOSIS — E78.5: ICD-10-CM

## 2018-12-14 DIAGNOSIS — R19.7: ICD-10-CM

## 2018-12-14 DIAGNOSIS — I34.1: ICD-10-CM

## 2018-12-14 DIAGNOSIS — I48.0: ICD-10-CM

## 2018-12-14 DIAGNOSIS — I10: ICD-10-CM

## 2018-12-14 DIAGNOSIS — G93.41: ICD-10-CM

## 2018-12-14 DIAGNOSIS — N17.9: ICD-10-CM

## 2018-12-14 DIAGNOSIS — F32.9: ICD-10-CM

## 2018-12-14 DIAGNOSIS — Z79.82: ICD-10-CM

## 2018-12-14 DIAGNOSIS — B37.49: Primary | ICD-10-CM

## 2018-12-14 DIAGNOSIS — Z90.49: ICD-10-CM

## 2018-12-14 LAB
ALBUMIN SERPL BCG-MCNC: 3.1 G/DL (ref 3.4–4.8)
ALP SERPL-CCNC: 85 U/L (ref 40–150)
ALT SERPL W P-5'-P-CCNC: 14 U/L (ref 8–55)
ANION GAP SERPL CALC-SCNC: 13 MMOL/L (ref 10–20)
AST SERPL-CCNC: 22 U/L (ref 5–34)
BACTERIA UR QL AUTO: (no result) HPF
BASOPHILS # BLD AUTO: 0.1 THOU/UL (ref 0–0.2)
BASOPHILS NFR BLD AUTO: 0.9 % (ref 0–1)
BILIRUB SERPL-MCNC: 0.2 MG/DL (ref 0.2–1.2)
BUN SERPL-MCNC: 42 MG/DL (ref 9.8–20.1)
CALCIUM SERPL-MCNC: 8 MG/DL (ref 7.8–10.44)
CHLORIDE SERPL-SCNC: 106 MMOL/L (ref 98–107)
CK SERPL-CCNC: 22 U/L (ref 29–168)
CO2 SERPL-SCNC: 27 MMOL/L (ref 23–31)
CREAT CL PREDICTED SERPL C-G-VRATE: 0 ML/MIN (ref 70–130)
CRYSTAL-AUWI FLAG: 0 (ref 0–15)
EOSINOPHIL # BLD AUTO: 0 THOU/UL (ref 0–0.7)
EOSINOPHIL NFR BLD AUTO: 0.2 % (ref 0–10)
GLOBULIN SER CALC-MCNC: 3.5 G/DL (ref 2.4–3.5)
GLUCOSE SERPL-MCNC: 154 MG/DL (ref 83–110)
HEV IGM SER QL: 0.4 (ref 0–7.99)
HGB BLD-MCNC: 14.4 G/DL (ref 12–16)
HYALINE CASTS #/AREA URNS LPF: (no result) LPF
LYMPHOCYTES # BLD: 1.1 THOU/UL (ref 1.2–3.4)
LYMPHOCYTES NFR BLD AUTO: 13.2 % (ref 21–51)
MCH RBC QN AUTO: 30.6 PG (ref 27–31)
MCV RBC AUTO: 92.9 FL (ref 78–98)
MONOCYTES # BLD AUTO: 1.2 THOU/UL (ref 0.11–0.59)
MONOCYTES NFR BLD AUTO: 14.6 % (ref 0–10)
NEUTROPHILS # BLD AUTO: 5.9 THOU/UL (ref 1.4–6.5)
NEUTROPHILS NFR BLD AUTO: 71 % (ref 42–75)
PATHC CAST-AUWI FLAG: 2.18 (ref 0–2.49)
PLATELET # BLD AUTO: 180 THOU/UL (ref 130–400)
POTASSIUM SERPL-SCNC: 4.8 MMOL/L (ref 3.5–5.1)
RBC # BLD AUTO: 4.72 MILL/UL (ref 4.2–5.4)
RBC UR QL AUTO: (no result) HPF (ref 0–3)
SODIUM SERPL-SCNC: 141 MMOL/L (ref 136–145)
SP GR UR STRIP: 1.02 (ref 1–1.04)
SPERM-AUWI FLAG: 0 (ref 0–9.9)
WBC # BLD AUTO: 8.3 THOU/UL (ref 4.8–10.8)
YEAST FLD HPF-#/AREA: (no result) HPF
YEAST-AUWI FLAG: 392.3 (ref 0–25)

## 2018-12-14 PROCEDURE — 82040 ASSAY OF SERUM ALBUMIN: CPT

## 2018-12-14 PROCEDURE — 87040 BLOOD CULTURE FOR BACTERIA: CPT

## 2018-12-14 PROCEDURE — 85025 COMPLETE CBC W/AUTO DIFF WBC: CPT

## 2018-12-14 PROCEDURE — 51701 INSERT BLADDER CATHETER: CPT

## 2018-12-14 PROCEDURE — 83630 LACTOFERRIN FECAL (QUAL): CPT

## 2018-12-14 PROCEDURE — S0028 INJECTION, FAMOTIDINE, 20 MG: HCPCS

## 2018-12-14 PROCEDURE — 87086 URINE CULTURE/COLONY COUNT: CPT

## 2018-12-14 PROCEDURE — 81015 MICROSCOPIC EXAM OF URINE: CPT

## 2018-12-14 PROCEDURE — 96361 HYDRATE IV INFUSION ADD-ON: CPT

## 2018-12-14 PROCEDURE — 71045 X-RAY EXAM CHEST 1 VIEW: CPT

## 2018-12-14 PROCEDURE — 74176 CT ABD & PELVIS W/O CONTRAST: CPT

## 2018-12-14 PROCEDURE — 96375 TX/PRO/DX INJ NEW DRUG ADDON: CPT

## 2018-12-14 PROCEDURE — 84134 ASSAY OF PREALBUMIN: CPT

## 2018-12-14 PROCEDURE — 87324 CLOSTRIDIUM AG IA: CPT

## 2018-12-14 PROCEDURE — 80048 BASIC METABOLIC PNL TOTAL CA: CPT

## 2018-12-14 PROCEDURE — 36415 COLL VENOUS BLD VENIPUNCTURE: CPT

## 2018-12-14 PROCEDURE — 81003 URINALYSIS AUTO W/O SCOPE: CPT

## 2018-12-14 PROCEDURE — 96365 THER/PROPH/DIAG IV INF INIT: CPT

## 2018-12-14 PROCEDURE — 76770 US EXAM ABDO BACK WALL COMP: CPT

## 2018-12-14 PROCEDURE — 80053 COMPREHEN METABOLIC PANEL: CPT

## 2018-12-14 PROCEDURE — 87449 NOS EACH ORGANISM AG IA: CPT

## 2018-12-14 PROCEDURE — A4353 INTERMITTENT URINARY CATH: HCPCS

## 2018-12-14 PROCEDURE — 93005 ELECTROCARDIOGRAM TRACING: CPT

## 2018-12-14 PROCEDURE — 84439 ASSAY OF FREE THYROXINE: CPT

## 2018-12-14 PROCEDURE — 84443 ASSAY THYROID STIM HORMONE: CPT

## 2018-12-14 PROCEDURE — 87804 INFLUENZA ASSAY W/OPTIC: CPT

## 2018-12-14 PROCEDURE — 82550 ASSAY OF CK (CPK): CPT

## 2018-12-14 PROCEDURE — 36416 COLLJ CAPILLARY BLOOD SPEC: CPT

## 2018-12-14 PROCEDURE — 80069 RENAL FUNCTION PANEL: CPT

## 2018-12-14 PROCEDURE — 83605 ASSAY OF LACTIC ACID: CPT

## 2018-12-15 LAB
ALBUMIN SERPL BCG-MCNC: 2.9 G/DL (ref 3.4–4.8)
ANION GAP SERPL CALC-SCNC: 10 MMOL/L (ref 10–20)
BUN SERPL-MCNC: 35 MG/DL (ref 9.8–20.1)
BUN/CREAT SERPL: 36.08
CALCIUM SERPL-MCNC: 7.6 MG/DL (ref 7.8–10.44)
CHLORIDE SERPL-SCNC: 110 MMOL/L (ref 98–107)
CO2 SERPL-SCNC: 27 MMOL/L (ref 23–31)
CREAT CL PREDICTED SERPL C-G-VRATE: 62 ML/MIN (ref 70–130)
GLUCOSE SERPL-MCNC: 67 MG/DL (ref 83–110)
HGB BLD-MCNC: 13.4 G/DL (ref 12–16)
MCH RBC QN AUTO: 30.4 PG (ref 27–31)
MCV RBC AUTO: 94.1 FL (ref 78–98)
MDIFF COMPLETE?: YES
PLATELET # BLD AUTO: 172 THOU/UL (ref 130–400)
PLATELET BLD QL SMEAR: (no result)
POTASSIUM SERPL-SCNC: 3.6 MMOL/L (ref 3.5–5.1)
RBC # BLD AUTO: 4.4 MILL/UL (ref 4.2–5.4)
SODIUM SERPL-SCNC: 143 MMOL/L (ref 136–145)
WBC # BLD AUTO: 7.6 THOU/UL (ref 4.8–10.8)

## 2018-12-15 RX ADMIN — Medication SCH MG: at 20:34

## 2018-12-15 RX ADMIN — MULTIPLE VITAMINS W/ MINERALS TAB SCH TAB: TAB at 08:07

## 2018-12-15 RX ADMIN — Medication SCH: at 08:08

## 2018-12-15 RX ADMIN — ASPIRIN SCH MG: 81 TABLET ORAL at 08:06

## 2018-12-15 RX ADMIN — Medication SCH: at 20:35

## 2018-12-15 RX ADMIN — Medication SCH MG: at 08:07

## 2018-12-15 RX ADMIN — FAMOTIDINE SCH: 10 INJECTION, SOLUTION INTRAVENOUS at 08:07

## 2018-12-15 NOTE — HP
PRIMARY CARE PHYSICIAN:  Ceci Harris DO



CHIEF COMPLAINT:  Nausea and vomiting and abdominal pain.



HISTORY OF PRESENTING ILLNESS:  Ms. Kidd is a pleasant 89-year-old female with

past medical history of chronic atrial fibrillation, mitral valve prolapse,

hyperlipidemia, and hypertension, as well as diabetes, who is a nursing home

resident at [facility]; presented to the emergency room with above-mentioned

complaint.  History is mainly obtained by the electronic medical records as the

patient has some confusion and is not able to provide most of the history. 



According to the patient and medical records from the ER, it seems like she had an

episode of vomiting, sitting at the dining table in the nursing home and was having

some abdominal pain as well.  In the ER note, said that she was sent over for

complaints of altered mental status and fever as well as lethargy and less urine

output.  She was noted to be febrile at 101.4 by the EMS.  She was also somewhat

hypoxic with 88% saturation on 2 L nasal cannula.  She was 99% on 2 L upon

presentation to the hospital. 



She was otherwise hemodynamically stable when presented to the ER.  A 12-lead EKG

showed nonspecific ST and T-wave abnormality with rate-controlled atrial

fibrillation.  Her CT scan of the brain was negative for any intracranial

hemorrhage, CVA, or mass.  Chest x-ray did not show any evidence of infiltrate.

Labs were consistent with dehydration and UTI was noted on the urinalysis.  Urine

culture and blood cultures were obtained and she was started on empiric IV

antibiotic and IV fluids and is now being admitted to medical floor for urinary

tract infection without any evidence of sepsis. 



At the time of my examination, the patient says that she is having some nausea, but

has not had any episode of vomiting since this morning. 



PAST MEDICAL HISTORY:  

1. Diabetes mellitus.

2. History of mitral valve prolapse.

3. Hypertension.

4. Dyslipidemia.

5. Chronic atrial fibrillation.



PAST SURGICAL HISTORY:  

1. Cholecystectomy.

2. Left breast mastectomy.

3. Bladder suspension surgery.

4. Appendectomy.



PSYCHIATRIC HISTORY:  Depression.



SOCIAL HISTORY:  A permanent resident of nursing home.  She reports that she is

ambulatory with the help of a walker.  No history of drug, tobacco, or alcohol

abuse. 



ALLERGIES:  NOTED AS LEVOFLOXACIN, PENICILLIN, AND VALIUM.



HOME MEDICATIONS:  Listed as following.  Please note that I am not dictating the

p.r.n. medications as the patient is taking more than 20 medications at this time.

The scheduled medications as follows, 

1. Gabapentin 400 mg p.o. t.i.d.

2. Lasix 40 mg daily.

3. Pepcid 20 mg p.o. b.i.d.

4. Methenamine hippurate 1 g p.o. b.i.d.

5. Multivitamin daily.

6. Losartan 50 mg daily.

7. Lantus 30 units subcu b.i.d.

8. Tramadol p.r.n.

9. Zofran p.r.n.

10. Cymbalta 30 mg daily.

11. Baclofen 10 mg t.i.d. p.r.n.

12. Aspirin 81 mg daily.

13. Simvastatin 20 mg daily.

14. Xarelto 10 mg daily.

15. Isosorbide mononitrate 30 mg daily.

16. Aricept 10 mg daily.



CODE STATUS:  The patient is unable to discuss code status at this time because of

some confusion.  There is an out-of-hospital DNR in the chart from the nursing home

signed by loretta rocha.  At this time, I am using it as a legal authorized document.

The patient will be do not intubate and resuscitate for now. 



LABORATORY DATA:  CBC is unremarkable.  Serum chemistry showed improvement in

creatinine from 1.33 upon presentation to 0.97.  Blood sugar anywhere from 862 to

154.  Liver enzymes unremarkable.  Lactic acid is normal at 1.1.  Urinalysis shows

+2 bacteria, multiple wbc's, and leukocyte esterase. 



REVIEW OF SYSTEMS:  She complains of some nausea and abdominal pain, but otherwise a

12-point review of system is negative except for those mentioned in the history and

physical.  The nurses have noted some two or three dark stools and C diff has been

sent, which was negative, however, the patient denies any loose stools recently. 



PHYSICAL EXAMINATION:

VITAL SIGNS:  Most recent vital signs; temperature 98, heart rate 90, saturating

100% on 2 L of nasal cannula, respirations 16, blood pressure 101/67. 

GENERAL:  No acute distress.  She is sitting comfortably up in bed, very pleasant. 

HEENT:  Mucous membrane is dry.  No oropharyngeal exudate or erythema.  Head is

normocephalic and atraumatic.  Pupils are equal and reactive to light and

accommodation.  Extraocular movement intact. 

NECK:  Supple without any lymphadenopathy, JVD, or bruit. 

CHEST:  Clear to auscultation without any wheezing, rales, or rhonchi. 

HEART:  Rate and rhythm are regular without any murmurs, rubs, or gallops. 

ABDOMEN:  Abdomen is diffusely tender more so in the left lower quadrant, but it is

soft without any rebound, guarding, or rigidity. 

EXTREMITIES:  Free of any cyanosis, clubbing, or edema. 

NEUROLOGICAL:  Nonfocal. 

SKIN:  Free of any rashes or bruises.  Feels warm and dry to touch. 

PSYCHIATRIC:  Normal affect.



IMPRESSION AND PLAN:  

1. Urinary tract infection, this is without evidence of sepsis at this time.  Urine

culture and blood cultures have been obtained.  She has been started on empiric IV

antibiotics and we will continue that for now and follow the results.  We will

gently rehydrate her as her physical examination is consistent with dehydration.

Renal function is improving.  I have reviewed her last echocardiogram done in 2015,

which showed preserved ejection fraction of 60% to 65% without any evidence of

diastolic dysfunction either. 

2. Acute renal insufficiency, improved after fluid resuscitation.  We will continue

the fluids at a low rate and avoid any nephrotoxic medications.  We will hold her

Lasix and losartan for now. 

3. Chronic atrial fibrillation.  She seems to be on Xarelto for this.  She is

currently rate controlled.  We will continue the anticoagulation with close

monitoring of H and H and she is currently asymptomatic. 

4. Diabetes mellitus.  We will start her on insulin sliding scale.  Her blood sugar

has been on the lower side for now.  Avoid long-acting insulin for now. 

5. Hypertension, currently on the lower side, likely because of the infection.  Once

again, this is not an evidence of sepsis or septic shock.  We will continue the IV

fluids. 

6. Diarrhea.  C diff has been sent and has been negative.  If her abdominal pain

worsens, we will obtain a CT scan of her abdomen.  She will be on clear liquid diet

for now. 

7. Code status.  Do not resuscitate or intubate.  This is based on my review of the

out-of-hospital DNR from nursing home in the chart. 

8. Deep venous thrombosis and gastrointestinal prophylaxis.  She is on Pepcid daily

and will continue the Xarelto for now. 

9. Add p.r.n. medication orders and continue supportive care.



DISPOSITION:  Ms. Kidd is currently being admitted to the hospital with a urinary

tract infection and acute renal insufficiency.  Estimated length of stay at this

time is at least 2 to 3 midnights.  Further management will depend upon her clinical

course. 







Job ID:  [id number]

## 2018-12-16 RX ADMIN — ONDANSETRON PRN MG: 2 INJECTION INTRAMUSCULAR; INTRAVENOUS at 05:10

## 2018-12-16 RX ADMIN — ONDANSETRON PRN MG: 2 INJECTION INTRAMUSCULAR; INTRAVENOUS at 20:42

## 2018-12-16 RX ADMIN — Medication SCH: at 08:40

## 2018-12-16 RX ADMIN — Medication SCH MG: at 20:35

## 2018-12-16 RX ADMIN — Medication SCH MG: at 08:38

## 2018-12-16 RX ADMIN — MULTIPLE VITAMINS W/ MINERALS TAB SCH TAB: TAB at 08:38

## 2018-12-16 RX ADMIN — FAMOTIDINE SCH: 10 INJECTION, SOLUTION INTRAVENOUS at 08:40

## 2018-12-16 RX ADMIN — ASPIRIN SCH MG: 81 TABLET ORAL at 08:38

## 2018-12-16 RX ADMIN — Medication SCH: at 20:36

## 2018-12-16 RX ADMIN — ONDANSETRON PRN MG: 2 INJECTION INTRAMUSCULAR; INTRAVENOUS at 12:58

## 2018-12-16 NOTE — PDOC.PN
- Subjective


Encounter Start Date: 12/16/18


Encounter Start Time: 12:35


Subjective: feels a little better but had an anxiety like attack yesterday


-: still nauseated a little bit.no abd pain or diarrhea





- Objective


Resuscitation Status - Order Detail:





12/15/18 13:00


Resuscitation Status Routine 


   Resuscitation Status: DNAR: NO Resuscitation


   Discussed with: OOH DNR from NH reviewed in chart








MAR Reviewed: Yes


Vital Signs & Weight: 


 Vital Signs (12 hours)











  Temp Pulse Resp BP Pulse Ox


 


 12/16/18 08:00  97.7 F  89  20  120/69  96


 


 12/16/18 01:25  97.5 F L  96  22 H  103/64  98








 Weight











Admit Weight                   220 lb


 


Weight                         220 lb














I&O: 


 











 12/15/18 12/16/18 12/17/18





 06:59 06:59 06:59


 


Intake Total 1650 1250 


 


Output Total 3  


 


Balance 1647 1250 











Result Diagrams: 


 12/15/18 04:30





 12/15/18 04:30


Additional Labs: 


 Accuchecks











  12/16/18 12/16/18 12/15/18





  11:52 05:03 21:43


 


POC Glucose  164 H  113 H  158 H














  12/15/18 12/15/18





  17:00 12:30


 


POC Glucose  158 H  83








Microbiology





12/15/18 06:20   Stool   C. difficile GDH Antigen & Toxins - Final


12/15/18 06:20   Rectal - Loose   Stool Lactoferrin - Final


12/14/18 20:06   Nasal swab   Influenza Types A,B Direct EIA - Final


12/14/18 20:28   Venous blood - Right Hand   Blood Culture - Preliminary


                              Specimen has been received and culture in 

progress.


                              No Growth to date.


12/14/18 19:42   Venous blood - Right Arm   Blood Culture - Preliminary


                              Specimen has been received and culture in 

progress.


                              No Growth to date.


12/14/18 19:42   Venous blood - Right Arm   Blood Culture - Preliminary


                              Gram Positive Cocci


12/14/18 19:25   Urine Straight Catheter   Urine Culture - Preliminary


                              Yeast species





 Laboratory Tests











  07/16/18 12/14/18 12/15/18





  14:19 19:42 04:30


 


Creatinine  1.13 H  1.33 H  0.97














Phys Exam





- Physical Examination


Constitutional: NAD


HEENT: PERRLA, moist MMs, sclera anicteric, oral pharynx no lesions


Neck: no nodes, no JVD, supple, full ROM


Respiratory: no wheezing, no rales, no rhonchi, clear to auscultation bilateral


Cardiovascular: RRR, no significant murmur, no rub


Gastrointestinal: soft, non-tender, no distention, positive bowel sounds


Musculoskeletal: no edema, pulses present


Neurological: non-focal, normal sensation, moves all 4 limbs


Psychiatric: normal affect, A&O x 3


Skin: no rash





Dx/Plan


(1) UTI (urinary tract infection)


Status: Acute   Comment: Present on Admission.   





(2) Atrial fibrillation


Code(s): I48.91 - UNSPECIFIED ATRIAL FIBRILLATION   Status: Chronic   


Qualifiers: 


   Atrial fibrillation type: chronic   Qualified Code(s): I48.2 - Chronic 

atrial fibrillation   





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


Code(s): E11.9 - TYPE 2 DIABETES MELLITUS WITHOUT COMPLICATIONS   Status: 

Chronic   Comment: controlled   





(4) Hyperlipidemia


Code(s): E78.5 - HYPERLIPIDEMIA, UNSPECIFIED   Status: Chronic   Comment: on 

statin   





(5) Hypertension


Code(s): I10 - ESSENTIAL (PRIMARY) HYPERTENSION   Status: Chronic   Comment: 

controlled   





- Plan


continue antibiotics, PT/OT, respiratory therapy, incentive spirometry, out of 

bed/ambulate, DVT proph w/SCDs


clinically stable.cont ABx. await final Cx results


-: if nausdea qiu snot improve,will get CT scan


-: Steve studies negative.Cdiff negative


-: am labs/home meds as below





* .








Review of Systems





- Review of Systems


Constitutional: weakness, malaise.  negative: fever, chills, sweats, other


ENT: negative: Ear Pain, Ear Discharge, Nose Pain, Nose Discharge, Nose 

Congestion, Mouth Pain, Mouth Swelling, Throat Pain, Throat Swelling, Other


Respiratory: negative: Cough, Dry, Shortness of Breath, Hemoptysis, SOB with 

Excertion, Pleuritic Pain, Sputum, Wheezing


Cardiovascular: negative: chest pain, palpitations, orthopnea, paroxysmal 

nocturnal dyspnea, edema, light headedness, other


Gastrointestinal: Nausea.  negative: Vomiting, Abdominal Pain, Diarrhea, 

Constipation, Melena, Hematochezia, Other


Genitourinary: negative: Dysuria, Frequency, Incontinence, Hematuria, Retention

, Other


Musculoskeletal: negative: Neck Pain, Shoulder Pain, Arm Pain, Back Pain, Hand 

Pain, Leg Pain, Foot Pain, Other


Skin: negative: Rash, Lesions, Sanjay, Bruising, Other


Neurological: negative: Weakness, Numbness, Incoordination, Change in Speech, 

Confusion, Seizures, Other





- Medications/Allergies


Allergies/Adverse Reactions: 


 Allergies











Allergy/AdvReac Type Severity Reaction Status Date / Time


 


iodine Allergy   Verified 12/26/17 23:06


 


levofloxacin [From Levaquin] Allergy   Verified 12/20/17 23:44


 


penicillin G Allergy   Verified 12/26/17 23:06


 


codeine AdvReac   Verified 12/26/17 23:06


 


diazepam [From Valium] AdvReac   Verified 12/26/17 23:06











Medications: 


 Current Medications





Ascorbic Acid (Vitamin C)  500 mg PO BID [facility]


   Last Admin: 12/16/18 08:38 Dose:  500 mg


Aspirin (Ecotrin)  81 mg PO QAM [facility]


   Last Admin: 12/16/18 08:38 Dose:  81 mg


Baclofen (Lioresal)  10 mg PO TID PRN


   PRN Reason: muscle spasms


Bisacodyl (Dulcolax)  10 mg PO DAILYPRN PRN


   PRN Reason: Constipation


Dextrose/Water (Dextrose 50%)  25 gm SLOW IVP PRN PRN


   PRN Reason: Hypoglycemia


Donepezil HCl (Aricept)  10 mg PO HS [facility]


   Last Admin: 12/15/18 20:34 Dose:  10 mg


Duloxetine HCl (Cymbalta)  60 mg PO QPM [facility]


   Last Admin: 12/15/18 20:34 Dose:  60 mg


Famotidine (Pepcid)  20 mg SLOW IVP QAM [facility]


   Last Admin: 12/16/18 08:40 Dose:  Not Given


Famotidine (Pepcid)  20 mg PO QAM [facility]


   Last Admin: 12/16/18 08:38 Dose:  20 mg


Glucagon (Glucagon)  1 mg IM PRN PRN


   PRN Reason: Hypoglycemia


Dextrose/Water (D5w)  1,000 mls @ 0 mls/hr IV .Q0M PRN


   PRN Reason: Hypoglycemia


Aztreonam 1 gm/ Sodium (Chloride)  100 mls @ 100 mls/hr IVPB 0800,[id number] [facility]


   Last Admin: 12/16/18 08:36 Dose:  100 mls


Sodium Chloride (Normal Saline 0.9%)  1,000 mls @ 75 mls/hr IV .[id number] [facility]


   Last Admin: 12/16/18 00:14 Dose:  1,000 mls


Insulin Human Lispro (Humalog)  0 units SC .MODERATE SLIDING SC PRN


   PRN Reason: Moderate Correctional Scale


Insulin Human Lispro (Humalog)  0 units SC .BEDTIME SLIDING SC PRN


   PRN Reason: Bedtime Correctional Scale


Iron/Minerals/Multivitamins (Theragran M)  1 tab PO DAILY [facility]


   Last Admin: 12/16/18 08:38 Dose:  1 tab


Isosorbide Mononitrate (Imdur Er)  30 mg PO QAM [facility]


   Last Admin: 12/16/18 08:38 Dose:  30 mg


Miscellaneous Medication (Pharmacy To Dose)  1 each IVPB PRN PRN


   PRN Reason: Pharmacy to dose


Cranberry Fruit [ (Cranberry] 450 Mg)  450 mg PO BID [facility]


   Last Admin: 12/16/18 08:39 Dose:  Not Given


Ondansetron HCl (Zofran)  4 mg IVP Q6H PRN


   PRN Reason: Nausea/Vomiting


   Last Admin: 12/16/18 12:58 Dose:  4 mg


Rivaroxaban (Xarelto)  10 mg PO DAILY [facility]


   Last Admin: 12/16/18 08:38 Dose:  10 mg


Senna/Docusate Sodium (Senokot S)  2 tab PO BID PRN


   PRN Reason: Constipation


Simvastatin (Zocor)  20 mg PO HS [facility]


   Last Admin: 12/15/18 20:34 Dose:  20 mg


Sodium Chloride (Flush - Normal Saline)  10 ml IVF Q12HR [facility]


   Last Admin: 12/16/18 08:40 Dose:  Not Given


Sodium Chloride (Flush - Normal Saline)  10 ml IVF PRN PRN


   PRN Reason: Saline Flush


Tramadol HCl (Ultram)  50 mg PO Q6H PRN


   PRN Reason: Mild-Moderate Pain (1-5)


   Last Admin: 12/16/18 10:26 Dose:  50 mg

## 2018-12-17 RX ADMIN — MULTIPLE VITAMINS W/ MINERALS TAB SCH TAB: TAB at 10:43

## 2018-12-17 RX ADMIN — Medication SCH ML: at 10:44

## 2018-12-17 RX ADMIN — Medication SCH MG: at 10:43

## 2018-12-17 RX ADMIN — ASPIRIN SCH MG: 81 TABLET ORAL at 10:43

## 2018-12-17 RX ADMIN — ONDANSETRON PRN MG: 2 INJECTION INTRAMUSCULAR; INTRAVENOUS at 07:46

## 2018-12-17 RX ADMIN — Medication SCH MG: at 20:26

## 2018-12-17 RX ADMIN — FAMOTIDINE SCH MG: 10 INJECTION, SOLUTION INTRAVENOUS at 07:47

## 2018-12-17 RX ADMIN — Medication SCH: at 20:29

## 2018-12-17 NOTE — CT
ABDOMEN AND PELVIC CT SCAN WITHOUT IV CONTRAST:

 

History: 

89-year-old female with history of persistent nausea and urinary tract infection. 

 

Comparison: 

5-30-11

 

FINDINGS: 

Bilateral pleural effusions and some bilateral pleural based parenchymal changes posteriorly. There i
s cardiomegaly. Status post cholecystectomy without ductal dilatation. 

 

Visualized pancreas, spleen, and adrenal glands are unremarkable. There are multiple small bilateral 
renal masses, some of which have a cystic appearance and others have a more indeterminate appearance.
 On the prior 2011 study, the patient had multiple bilateral renal cysts at that time, some of which 
were hemorrhagic. At least one of the left renal cysts seen on the prior study has resolved. Several 
appear stable, there are several that are potentially new or larger than on the prior 2011 study. The
 largest of these renal masses are up to approximately 2.0 cm in size. Given hematuria, follow 
up abdomen and pelvis with CT urogram or renal mass protocol with and without IV contrast should prob
ably be considered. If the patient cannot receive IV contrast, a follow up renal ultrasound might be 
considered although these are small and may not be able to be definitely characterized by ultrasound.
 In that case, a noncontrast MRI examination might give additional information. No evidence for adeno
kerri. Minimal urinary bladder wall thickening, nonspecific, but could certainly be seen with some as
sociated cystitis. Moderate solid fecal material in a dilated rectum. No abscess or abnormal fluid co
llection. 

 

IMPRESSION: 

Multiple bilateral renal masses up to approximately 2 cm cysts and/or hemorrhagic cysts, although cer
tainly solid masses are not excluded. Given hematuria, follow up with and without contrast imaging fo
r renal mass or CT urography protocol should be considered. If the patient cannot have IV contrast, r
enal ultrasound and/or noncontrast MRI study might give additional information. Minimal urinary bladd
er wall thickening, nonspecific, possibly related to mild cystitis. No adenopathy. Small hiatal herni
a. Small bilateral pleural effusions and pleural based parenchymal changes. 

 

POS: [facility]

## 2018-12-17 NOTE — PDOC.PN
- Subjective


Encounter Start Date: 12/17/18


Encounter Start Time: 16:17


Subjective: continues to have nausea.no vomiting.mild abd discomfort


-: on CLD


-: no fever/chills





- Objective


Resuscitation Status - Order Detail:





12/15/18 13:00


Resuscitation Status Routine 


   Resuscitation Status: DNAR: NO Resuscitation


   Discussed with: OOH DNR from NH reviewed in chart








MAR Reviewed: Yes


Vital Signs & Weight: 


 Vital Signs (12 hours)











  Temp Pulse Resp BP Pulse Ox


 


 12/17/18 08:00  97.3 F L  87  20  136/91 H  94 L








 Weight











Admit Weight                   220 lb


 


Weight                         220 lb














I&O: 


 











 12/16/18 12/17/18 12/18/18





 06:59 06:59 06:59


 


Intake Total 1250 1350 


 


Output Total  2 


 


Balance 1250 1348 











Result Diagrams: 


 12/18/18 08:57





 12/18/18 08:57


Additional Labs: 


 Accuchecks











  12/17/18 12/17/18 12/16/18





  11:02 04:44 20:34


 


POC Glucose  139 H  136 H  157 H














  12/16/18





  16:37


 


POC Glucose  118 H








Microbiology





12/15/18 06:20   Stool   C. difficile GDH Antigen & Toxins - Final


12/15/18 06:20   Rectal - Loose   Stool Lactoferrin - Final


12/14/18 20:06   Nasal swab   Influenza Types A,B Direct EIA - Final


12/14/18 19:25   Urine Straight Catheter   Urine Culture - Final


                              Presumptive Soco albicans


12/14/18 20:28   Venous blood - Right Hand   Blood Culture - Preliminary


                              NO GROWTH AT 48 HOURS


12/14/18 19:42   Venous blood - Right Arm   Blood Culture - Preliminary


                              Coagulase Neg Staphylococcus








Radiology Reviewed by me: Yes (CT A/P-b/l renal cysts)





Phys Exam





- Physical Examination


Constitutional: NAD


HEENT: PERRLA, moist MMs, sclera anicteric, oral pharynx no lesions


Neck: no nodes, no JVD, supple, full ROM


Respiratory: no wheezing, no rales, no rhonchi, clear to auscultation bilateral


Cardiovascular: RRR, no significant murmur, no rub


Gastrointestinal: soft, non-tender, no distention, positive bowel sounds


Musculoskeletal: no edema, pulses present


Neurological: non-focal, normal sensation, moves all 4 limbs


Psychiatric: normal affect, A&O x 3


Skin: no rash





Dx/Plan


(1) UTI (urinary tract infection)


Status: Acute   Comment: Present on Admission.   





(2) Candiduria


Code(s): B37.49 - OTHER UROGENITAL CANDIDIASIS   Status: Acute   Comment: Add 

Diflucan   





(3) Atrial fibrillation


Code(s): I48.91 - UNSPECIFIED ATRIAL FIBRILLATION   Status: Chronic   


Qualifiers: 


   Atrial fibrillation type: chronic   Qualified Code(s): I48.2 - Chronic 

atrial fibrillation   





(4) Diabetes mellitus type 2 in nonobese


Code(s): E11.9 - TYPE 2 DIABETES MELLITUS WITHOUT COMPLICATIONS   Status: 

Chronic   Comment: controlled   





(5) Hyperlipidemia


Code(s): E78.5 - HYPERLIPIDEMIA, UNSPECIFIED   Status: Chronic   Comment: on 

statin   





(6) Hypertension


Code(s): I10 - ESSENTIAL (PRIMARY) HYPERTENSION   Status: Chronic   Comment: 

controlled   





- Plan


continue antibiotics, PT/OT, out of bed/ambulate, DVT proph w/SCDs


advance to full liquid and monitor


-: Check renal US given cysta on CT


-: cont diflucan and ABx. follow Cx 


-: add OT/PT.ambulate and encourage PO intake


-: am labs





* .








Review of Systems





- Review of Systems


Constitutional: weakness, malaise.  negative: fever, chills, sweats, other


ENT: negative: Ear Pain, Ear Discharge, Nose Pain, Nose Discharge, Nose 

Congestion, Mouth Pain, Mouth Swelling, Throat Pain, Throat Swelling, Other


Respiratory: negative: Cough, Dry, Shortness of Breath, Hemoptysis, SOB with 

Excertion, Pleuritic Pain, Sputum, Wheezing


Cardiovascular: negative: chest pain, palpitations, orthopnea, paroxysmal 

nocturnal dyspnea, edema, light headedness, other


Gastrointestinal: Nausea, Abdominal Pain.  negative: Vomiting, Diarrhea, 

Constipation, Melena, Hematochezia, Other


Genitourinary: negative: Dysuria, Frequency, Incontinence, Hematuria, Retention

, Other


Neurological: negative: Weakness, Numbness, Incoordination, Change in Speech, 

Confusion, Seizures, Other





- Medications/Allergies


Allergies/Adverse Reactions: 


 Allergies











Allergy/AdvReac Type Severity Reaction Status Date / Time


 


iodine Allergy   Verified 12/26/17 23:06


 


levofloxacin [From Levaquin] Allergy   Verified 12/20/17 23:44


 


penicillin G Allergy   Verified 12/26/17 23:06


 


codeine AdvReac   Verified 12/26/17 23:06


 


diazepam [From Valium] AdvReac   Verified 12/26/17 23:06











Medications: 


 Current Medications





Ascorbic Acid (Vitamin C)  500 mg PO BID [facility]


   Last Admin: 12/18/18 08:07 Dose:  500 mg


Aspirin (Ecotrin)  81 mg PO QAM [facility]


   Last Admin: 12/18/18 08:06 Dose:  81 mg


Baclofen (Lioresal)  10 mg PO TID PRN


   PRN Reason: muscle spasms


Bisacodyl (Dulcolax)  10 mg PO DAILYPRN PRN


   PRN Reason: Constipation


Dextrose/Water (Dextrose 50%)  25 gm SLOW IVP PRN PRN


   PRN Reason: Hypoglycemia


Donepezil HCl (Aricept)  10 mg PO HS [facility]


   Last Admin: 12/17/18 20:27 Dose:  10 mg


Duloxetine HCl (Cymbalta)  60 mg PO QPM [facility]


   Last Admin: 12/17/18 20:27 Dose:  60 mg


Famotidine (Pepcid)  20 mg SLOW IVP QAM [facility]


   Last Admin: 12/18/18 08:10 Dose:  Not Given


Famotidine (Pepcid)  20 mg PO BID [facility]


   Last Admin: 12/18/18 08:07 Dose:  20 mg


Gabapentin (Neurontin)  400 mg PO TID [facility]


   Last Admin: 12/18/18 08:07 Dose:  400 mg


Glucagon (Glucagon)  1 mg IM PRN PRN


   PRN Reason: Hypoglycemia


Dextrose/Water (D5w)  1,000 mls @ 0 mls/hr IV .Q0M PRN


   PRN Reason: Hypoglycemia


Aztreonam 1 gm/ Sodium (Chloride)  100 mls @ 100 mls/hr IVPB 0800,1600,2359 [facility]


   Last Admin: 12/18/18 08:03 Dose:  100 mls


Fluconazole/Sodium Chloride 100 mg/ Miscellaneous Medication  50 mls @ 100 mls/

hr IVPB DAILY [facility]


   Last Admin: 12/18/18 09:45 Dose:  50 mls


Sodium Chloride (Normal Saline 0.9%)  1,000 mls @ 50 mls/hr IV .Q20H [facility]


   Last Admin: 12/17/18 20:24 Dose:  1,000 mls


Insulin Human Lispro (Humalog)  0 units SC .MODERATE SLIDING SC PRN


   PRN Reason: Moderate Correctional Scale


Insulin Human Lispro (Humalog)  0 units SC .BEDTIME SLIDING SC PRN


   PRN Reason: Bedtime Correctional Scale


Iron/Minerals/Multivitamins (Theragran M)  1 tab PO DAILY [facility]


   Last Admin: 12/18/18 08:06 Dose:  1 tab


Isosorbide Mononitrate (Imdur Er)  30 mg PO QAM [facility]


   Last Admin: 12/18/18 08:05 Dose:  30 mg


Losartan Potassium (Cozaar)  50 mg PO DAILY [facility]


   Last Admin: 12/18/18 08:10 Dose:  50 mg


Miscellaneous Medication (Pharmacy To Dose)  1 each IVPB PRN PRN


   PRN Reason: Pharmacy to dose


Cranberry Fruit [ (Cranberry] 450 Mg)  450 mg PO BID [facility]


   Last Admin: 12/18/18 08:10 Dose:  Not Given


Ondansetron HCl (Zofran)  4 mg IVP Q6H PRN


   PRN Reason: Nausea/Vomiting


   Last Admin: 12/18/18 08:18 Dose:  4 mg


Rivaroxaban (Xarelto)  10 mg PO DAILY [facility]


   Last Admin: 12/18/18 08:05 Dose:  10 mg


Senna/Docusate Sodium (Senokot S)  2 tab PO BID PRN


   PRN Reason: Constipation


Simvastatin (Zocor)  20 mg PO HS [facility]


   Last Admin: 12/17/18 20:29 Dose:  20 mg


Sodium Chloride (Flush - Normal Saline)  10 ml IVF Q12HR [facility]


   Last Admin: 12/18/18 08:20 Dose:  10 ml


Sodium Chloride (Flush - Normal Saline)  10 ml IVF PRN PRN


   PRN Reason: Saline Flush


Tramadol HCl (Ultram)  50 mg PO Q6H PRN


   PRN Reason: Mild-Moderate Pain (1-5)


   Last Admin: 12/16/18 10:26 Dose:  50 mg

## 2018-12-18 LAB
ANION GAP SERPL CALC-SCNC: 11 MMOL/L (ref 10–20)
BASOPHILS # BLD AUTO: 0 THOU/UL (ref 0–0.2)
BASOPHILS NFR BLD AUTO: 0.3 % (ref 0–1)
BUN SERPL-MCNC: 9 MG/DL (ref 9.8–20.1)
CALCIUM SERPL-MCNC: 8.2 MG/DL (ref 7.8–10.44)
CHLORIDE SERPL-SCNC: 108 MMOL/L (ref 98–107)
CO2 SERPL-SCNC: 25 MMOL/L (ref 23–31)
CREAT CL PREDICTED SERPL C-G-VRATE: 85 ML/MIN (ref 70–130)
EOSINOPHIL # BLD AUTO: 0.2 THOU/UL (ref 0–0.7)
EOSINOPHIL NFR BLD AUTO: 3.1 % (ref 0–10)
GLUCOSE SERPL-MCNC: 125 MG/DL (ref 83–110)
HGB BLD-MCNC: 12.9 G/DL (ref 12–16)
LYMPHOCYTES # BLD: 2.1 THOU/UL (ref 1.2–3.4)
LYMPHOCYTES NFR BLD AUTO: 27.1 % (ref 21–51)
MCH RBC QN AUTO: 29.1 PG (ref 27–31)
MCV RBC AUTO: 91.2 FL (ref 78–98)
MONOCYTES # BLD AUTO: 0.7 THOU/UL (ref 0.11–0.59)
MONOCYTES NFR BLD AUTO: 9.8 % (ref 0–10)
NEUTROPHILS # BLD AUTO: 4.5 THOU/UL (ref 1.4–6.5)
NEUTROPHILS NFR BLD AUTO: 59.7 % (ref 42–75)
PLATELET # BLD AUTO: 160 THOU/UL (ref 130–400)
POTASSIUM SERPL-SCNC: 3.8 MMOL/L (ref 3.5–5.1)
RBC # BLD AUTO: 4.43 MILL/UL (ref 4.2–5.4)
SODIUM SERPL-SCNC: 140 MMOL/L (ref 136–145)
WBC # BLD AUTO: 7.6 THOU/UL (ref 4.8–10.8)

## 2018-12-18 RX ADMIN — ASPIRIN SCH MG: 81 TABLET ORAL at 08:06

## 2018-12-18 RX ADMIN — FAMOTIDINE SCH: 10 INJECTION, SOLUTION INTRAVENOUS at 08:10

## 2018-12-18 RX ADMIN — ONDANSETRON PRN MG: 2 INJECTION INTRAMUSCULAR; INTRAVENOUS at 08:18

## 2018-12-18 RX ADMIN — MULTIPLE VITAMINS W/ MINERALS TAB SCH TAB: TAB at 08:06

## 2018-12-18 RX ADMIN — Medication SCH ML: at 08:20

## 2018-12-18 RX ADMIN — Medication SCH: at 21:17

## 2018-12-18 RX ADMIN — Medication SCH MG: at 08:07

## 2018-12-18 RX ADMIN — Medication SCH MG: at 21:15

## 2018-12-18 RX ADMIN — FLUCONAZOLE SCH MLS: 2 INJECTION, SOLUTION INTRAVENOUS at 09:45

## 2018-12-18 NOTE — PDOC.PN
- Subjective


Encounter Start Date: 12/18/18


Encounter Start Time: 13:58





Ms. Kidd was seen today in follow-up of UTI and intractable nausea. She says 

she is beginning to feel better. She was able to eat some of the full liquid 

diet.





- Objective


Resuscitation Status - Order Detail:





12/15/18 13:00


Resuscitation Status Routine 


   Resuscitation Status: DNAR: NO Resuscitation


   Discussed with: OOH DNR from NH reviewed in chart








MAR Reviewed: Yes


Vital Signs & Weight: 


 Vital Signs (12 hours)











  Temp Pulse Resp BP Pulse Ox


 


 12/18/18 08:00  98.1 F  90  22 H  124/76  94 L








 Weight











Admit Weight                   220 lb


 


Weight                         220 lb














I&O: 


 











 12/17/18 12/18/18 12/19/18





 06:59 06:59 06:59


 


Intake Total 1350 2071 


 


Output Total 2 2 


 


Balance 1348 2069 











Result Diagrams: 


 12/18/18 08:57





 12/18/18 08:57


Additional Labs: 


 Accuchecks











  12/18/18 12/18/18 12/17/18





  11:49 05:25 20:35


 


POC Glucose  143 H  122 H  136 H














  12/17/18





  16:15


 


POC Glucose  155 H














Phys Exam





- Physical Examination


HEENT: PERRLA, sclera anicteric


Respiratory: no wheezing, no rales, no rhonchi, clear to auscultation bilateral


Cardiovascular: RRR, no significant murmur, no rub


Gastrointestinal: soft, no distention, positive bowel sounds


+ mild diffuse tenderness no rebound or guarding


Musculoskeletal: no edema


Neurological: non-focal, moves all 4 limbs


Psychiatric: A&O x 3





Dx/Plan


(1) Nausea & vomiting


Code(s): R11.2 - NAUSEA WITH VOMITING, UNSPECIFIED   Status: Acute   





(2) UTI (urinary tract infection)


Status: Acute   Comment: Present on Admission.   





(3) Atrial fibrillation


Code(s): I48.91 - UNSPECIFIED ATRIAL FIBRILLATION   Status: Chronic   


Qualifiers: 


   Atrial fibrillation type: permanent   Qualified Code(s): I48.2 - Chronic 

atrial fibrillation   





(4) Diabetes mellitus type 2 in nonobese


Code(s): E11.9 - TYPE 2 DIABETES MELLITUS WITHOUT COMPLICATIONS   Status: 

Chronic   Comment: controlled   





(5) Hypertension


Code(s): I10 - ESSENTIAL (PRIMARY) HYPERTENSION   Status: Chronic   Comment: 

controlled   





- Plan





* Nausea and Vomiting- her symptoms are improving. Possibly due to UTI. She 

also is diabetic, and she could have some super-imposed diabetic gastroparesis- 

will place her on a short course of reglan


* UTI- urine cultures are negative- will continue Aztreonam for 1-2 more days 

then transition to an oral agent


* HTN - blood pressure is controlled


* AFIB- her heart rate is stable- and continue  Xarelto for CVA prevention


* Abnormal CT scan of abdomen- will follow-up of renal ultrasound


* DM- blood glucose is stable

## 2018-12-18 NOTE — EKG
Test Reason : 

Blood Pressure : ***/*** mmHG

Vent. Rate : 099 BPM     Atrial Rate : 099 BPM

   P-R Int : 078 ms          QRS Dur : 080 ms

    QT Int : 302 ms       P-R-T Axes : 000 007 015 degrees

   QTc Int : 387 ms

 

Sinus rhythm

Nonspecific ST and T wave abnormality

Abnormal ECG

Tremor Artifact

 

Confirmed by DIANNA YOUNG, MARTINA PEREZ (9),  EDGARD CONSTANTINO ([id number]) on 12/18/2018 12:50:42 PM

 

Referred By:             Confirmed By:MARTINA BUSTAMANTE MD

## 2018-12-18 NOTE — ULT
BILATERAL RENAL ULTRASOUND:

 

Date:  12/18/18 

 

INDICATION:

History of hemorrhagic cyst. 

 

COMPARISON:  

CT of the abdomen and pelvis dated 12/17/18. 

 

FINDINGS:

The right kidney measures 9.9 x 5.2 x 4.5 cm. The left kidney measures 10.7 x 5.8 x 5.0 cm. There is 
a 2.9 cm simple cyst involving the superior pole of the left kidney. This likely corresponds to the l
esion identified on the CT examination on image 24 of series 2. There is an additional exophytic hype
rdense lesion off the lateral aspect of the superior pole of the left kidney that is not definitely s
een on the current ultrasound due to overlying bowel gas on image 23 of series 2 of the CT evaluation
. An additional exophytic lesion is seen off the medial aspect of the left mid kidney measuring 1.5 c
m on image 30 of series 2 on the prior CT. This is not definitely seen on the ultrasound exam.

 

There is bilateral renal cortical thinning. The visualized bladder is unremarkable. 

 

IMPRESSION: 

Overlying bowel gas limits evaluation for the lesions involving the left kidney seen on the recent CT
 evaluation. The larger cystic lesion involving the superior pole of the left kidney is consistent wi
th a simple cyst. Two additional exophytic lesions off the lateral aspect of the superior pole of the
 left kidney and off the medial aspect of the left mid kidney are not fully characterized. A noncontr
ast MRI examination may be helpful for further characterization. 

 

 

POS: KATIUSKA

## 2018-12-19 RX ADMIN — Medication SCH: at 21:10

## 2018-12-19 RX ADMIN — ASPIRIN SCH MG: 81 TABLET ORAL at 07:56

## 2018-12-19 RX ADMIN — FLUCONAZOLE SCH MLS: 2 INJECTION, SOLUTION INTRAVENOUS at 11:14

## 2018-12-19 RX ADMIN — Medication SCH ML: at 07:58

## 2018-12-19 RX ADMIN — Medication SCH MG: at 21:08

## 2018-12-19 RX ADMIN — INSULIN LISPRO PRN UNIT: 100 INJECTION, SOLUTION INTRAVENOUS; SUBCUTANEOUS at 12:26

## 2018-12-19 RX ADMIN — MULTIPLE VITAMINS W/ MINERALS TAB SCH TAB: TAB at 07:55

## 2018-12-19 RX ADMIN — Medication SCH MG: at 07:57

## 2018-12-19 NOTE — PDOC.PN
- Subjective


Encounter Start Date: 12/19/18


Encounter Start Time: 16:15





Ms. Kidd was seen today in follow-up of UTI and nausea and vomiting. She 

says she was not able to tolerate the solid diet well, she got nauseated and 

says she feels full. She was able to eat the pudding and amber crackers well. 

She denies any abdominal pain. Her nurse noted that she seems to complain of 

the nausea more when she is turned or moved to sit up. 





- Objective


Resuscitation Status - Order Detail:





12/15/18 13:00


Resuscitation Status Routine 


   Resuscitation Status: DNAR: NO Resuscitation


   Discussed with: OOH DNR from NH reviewed in chart








MAR Reviewed: Yes


Vital Signs & Weight: 


 Vital Signs (12 hours)











  Temp Pulse Pulse Pulse Resp BP BP


 


 12/19/18 10:30    90  90   124/80  144/87 H


 


 12/19/18 08:00       


 


 12/19/18 07:39  98.1 F  90    17  














  BP Pulse Ox


 


 12/19/18 10:30  


 


 12/19/18 08:00   93 L


 


 12/19/18 07:39  134/81  91 L








 Weight











Admit Weight                   220 lb


 


Weight                         220 lb














I&O: 


 











 12/18/18 12/19/18 12/20/18





 06:59 06:59 06:59


 


Intake Total 2071 717 


 


Output Total 2  


 


Balance 2061 717 











Result Diagrams: 


 12/18/18 08:57





 12/18/18 08:57


Additional Labs: 


 Accuchecks











  12/19/18 12/19/18 12/19/18





  16:06 11:41 05:15


 


POC Glucose  201 H  221 H  137 H














  12/18/18 12/18/18





  21:05 16:06


 


POC Glucose  158 H  171 H














Phys Exam





- Physical Examination


HEENT: PERRLA


Respiratory: no wheezing, no rales, no rhonchi, clear to auscultation bilateral


Cardiovascular: RRR, no significant murmur, no rub


Gastrointestinal: soft, non-tender, no distention, positive bowel sounds


Musculoskeletal: pulses present, edema present





Dx/Plan


(1) Nausea & vomiting


Code(s): R11.2 - NAUSEA WITH VOMITING, UNSPECIFIED   Status: Acute   





(2) UTI (urinary tract infection)


Status: Acute   Comment: Present on Admission.   





(3) Atrial fibrillation


Code(s): I48.91 - UNSPECIFIED ATRIAL FIBRILLATION   Status: Chronic   


Qualifiers: 


   Atrial fibrillation type: permanent   Qualified Code(s): I48.2 - Chronic 

atrial fibrillation   





(4) Diabetes mellitus type 2 in nonobese


Code(s): E11.9 - TYPE 2 DIABETES MELLITUS WITHOUT COMPLICATIONS   Status: 

Chronic   Comment: controlled   





(5) Hypertension


Code(s): I10 - ESSENTIAL (PRIMARY) HYPERTENSION   Status: Chronic   Comment: 

controlled   





- Plan





* Nausea and vomiting-  likely multifactoral - from diabetic gastroparesis, UTI

, and possible positional vertigo- will place her back on a full liquid diet


* Continue a trial of reglan


* Can add Meclizine as needed


* DM- blood glucose is in acceptable range


* HTN- blood pressure is stable


* Renal cysts- can consider outpatient MRI when she is more stable


* Back to NH when she is able to tolerate p.o. better.

## 2018-12-20 RX ADMIN — Medication SCH ML: at 23:02

## 2018-12-20 RX ADMIN — FLUCONAZOLE SCH MLS: 2 INJECTION, SOLUTION INTRAVENOUS at 09:03

## 2018-12-20 RX ADMIN — MULTIPLE VITAMINS W/ MINERALS TAB SCH TAB: TAB at 09:00

## 2018-12-20 RX ADMIN — Medication SCH ML: at 09:01

## 2018-12-20 RX ADMIN — ASPIRIN SCH MG: 81 TABLET ORAL at 09:00

## 2018-12-20 RX ADMIN — INSULIN LISPRO PRN UNIT: 100 INJECTION, SOLUTION INTRAVENOUS; SUBCUTANEOUS at 17:00

## 2018-12-20 RX ADMIN — INSULIN LISPRO PRN UNIT: 100 INJECTION, SOLUTION INTRAVENOUS; SUBCUTANEOUS at 05:25

## 2018-12-20 RX ADMIN — Medication SCH MG: at 09:00

## 2018-12-20 RX ADMIN — ONDANSETRON PRN MG: 2 INJECTION INTRAMUSCULAR; INTRAVENOUS at 09:07

## 2018-12-20 RX ADMIN — KETOTIFEN FUMARATE SCH DROP: 0.35 SOLUTION/ DROPS OPHTHALMIC at 23:01

## 2018-12-20 RX ADMIN — Medication SCH MG: at 20:46

## 2018-12-20 NOTE — PDOC.PN
- Subjective


Encounter Start Date: 12/20/18


Encounter Start Time: 14:53





Ms. Kidd was seen today in follow-up of UTI and nausea and vomiting. She is 

feeling a bit better. She still is only eating about 30% of her meals. She does 

not feel ready to advance her diet yet.





- Objective


Resuscitation Status - Order Detail:





12/15/18 13:00


Resuscitation Status Routine 


   Resuscitation Status: DNAR: NO Resuscitation


   Discussed with: OOH DNR from NH reviewed in chart








MAR Reviewed: Yes


Vital Signs & Weight: 


 Vital Signs (12 hours)











  Temp Pulse Resp BP Pulse Ox


 


 12/20/18 11:01     148/85 H 


 


 12/20/18 09:34      93 L


 


 12/20/18 09:15      93 L


 


 12/20/18 07:49  97.6 F  93  20  152/99 H  91 L








 Weight











Admit Weight                   220 lb


 


Weight                         220 lb














I&O: 


 











 12/19/18 12/20/18 12/21/18





 06:59 06:59 06:59


 


Intake Total 717 875 


 


Balance 717 875 











Result Diagrams: 


 12/18/18 08:57





 12/18/18 08:57


Additional Labs: 


 Accuchecks











  12/20/18 12/20/18 12/19/18





  11:40 05:25 21:08


 


POC Glucose  145 H  180 H  194 H














  12/19/18





  16:06


 


POC Glucose  201 H














Phys Exam





- Physical Examination


HEENT: PERRLA


Respiratory: no wheezing, no rales, no rhonchi, clear to auscultation bilateral


Cardiovascular: RRR, no significant murmur, no rub


Gastrointestinal: soft, non-tender, no distention, positive bowel sounds


Musculoskeletal: pulses present, edema present


+ edema in both her upper and lower extremities


Neurological: non-focal, moves all 4 limbs





Dx/Plan


(1) Nausea & vomiting


Code(s): R11.2 - NAUSEA WITH VOMITING, UNSPECIFIED   Status: Acute   





(2) UTI (urinary tract infection)


Status: Acute   Comment: Present on Admission.   





(3) Atrial fibrillation


Code(s): I48.91 - UNSPECIFIED ATRIAL FIBRILLATION   Status: Chronic   


Qualifiers: 


   Atrial fibrillation type: permanent   Qualified Code(s): I48.2 - Chronic 

atrial fibrillation   





(4) Diabetes mellitus type 2 in nonobese


Code(s): E11.9 - TYPE 2 DIABETES MELLITUS WITHOUT COMPLICATIONS   Status: 

Chronic   Comment: controlled   





(5) Hypertension


Code(s): I10 - ESSENTIAL (PRIMARY) HYPERTENSION   Status: Chronic   Comment: 

controlled   





- Plan





* Nausea- slowly improving- continue to encourage oral intake


* UTI- she is on day # 5 of Aztreonam- will treat one more day then consider 

discontinuing antibiotics


* HTN- blood pressure is controlled


* DM- blood glucose is also controlled


* Volume overload- will discontinue IV fluids, and give a dose of Lasix IV.

## 2018-12-21 LAB
ALBUMIN SERPL BCG-MCNC: 2.8 G/DL (ref 3.4–4.8)
ANION GAP SERPL CALC-SCNC: 13 MMOL/L (ref 10–20)
BASOPHILS # BLD AUTO: 0 THOU/UL (ref 0–0.2)
BASOPHILS NFR BLD AUTO: 0.1 % (ref 0–1)
BUN SERPL-MCNC: 5 MG/DL (ref 9.8–20.1)
CALCIUM SERPL-MCNC: 8.5 MG/DL (ref 7.8–10.44)
CHLORIDE SERPL-SCNC: 106 MMOL/L (ref 98–107)
CO2 SERPL-SCNC: 26 MMOL/L (ref 23–31)
CREAT CL PREDICTED SERPL C-G-VRATE: 83 ML/MIN (ref 70–130)
EOSINOPHIL # BLD AUTO: 0.2 THOU/UL (ref 0–0.7)
EOSINOPHIL NFR BLD AUTO: 2.4 % (ref 0–10)
GLUCOSE SERPL-MCNC: 174 MG/DL (ref 83–110)
HGB BLD-MCNC: 12.5 G/DL (ref 12–16)
LYMPHOCYTES # BLD: 1.7 THOU/UL (ref 1.2–3.4)
LYMPHOCYTES NFR BLD AUTO: 21.1 % (ref 21–51)
MCH RBC QN AUTO: 29.8 PG (ref 27–31)
MCV RBC AUTO: 91.2 FL (ref 78–98)
MONOCYTES # BLD AUTO: 0.8 THOU/UL (ref 0.11–0.59)
MONOCYTES NFR BLD AUTO: 10.2 % (ref 0–10)
NEUTROPHILS # BLD AUTO: 5.5 THOU/UL (ref 1.4–6.5)
NEUTROPHILS NFR BLD AUTO: 66.2 % (ref 42–75)
PLATELET # BLD AUTO: 121 THOU/UL (ref 130–400)
POTASSIUM SERPL-SCNC: 4.1 MMOL/L (ref 3.5–5.1)
RBC # BLD AUTO: 4.19 MILL/UL (ref 4.2–5.4)
SODIUM SERPL-SCNC: 141 MMOL/L (ref 136–145)
T4 FREE SERPL-MCNC: 1.01 NG/DL (ref 0.7–1.48)
TSH SERPL DL<=0.005 MIU/L-ACNC: 0.91 UIU/ML (ref 0.35–4.94)
WBC # BLD AUTO: 8.2 THOU/UL (ref 4.8–10.8)

## 2018-12-21 RX ADMIN — ONDANSETRON PRN MG: 2 INJECTION INTRAMUSCULAR; INTRAVENOUS at 08:23

## 2018-12-21 RX ADMIN — Medication SCH MG: at 08:14

## 2018-12-21 RX ADMIN — INSULIN LISPRO PRN UNIT: 100 INJECTION, SOLUTION INTRAVENOUS; SUBCUTANEOUS at 16:54

## 2018-12-21 RX ADMIN — ASPIRIN SCH MG: 81 TABLET ORAL at 08:13

## 2018-12-21 RX ADMIN — Medication PRN ML: at 10:04

## 2018-12-21 RX ADMIN — Medication SCH ML: at 08:16

## 2018-12-21 RX ADMIN — KETOTIFEN FUMARATE SCH DROP: 0.35 SOLUTION/ DROPS OPHTHALMIC at 08:15

## 2018-12-21 RX ADMIN — INSULIN LISPRO PRN UNIT: 100 INJECTION, SOLUTION INTRAVENOUS; SUBCUTANEOUS at 12:31

## 2018-12-21 RX ADMIN — FLUCONAZOLE SCH MLS: 2 INJECTION, SOLUTION INTRAVENOUS at 10:00

## 2018-12-21 RX ADMIN — Medication SCH MG: at 21:29

## 2018-12-21 RX ADMIN — MULTIPLE VITAMINS W/ MINERALS TAB SCH TAB: TAB at 08:14

## 2018-12-21 RX ADMIN — Medication SCH ML: at 21:31

## 2018-12-21 RX ADMIN — KETOTIFEN FUMARATE SCH DROP: 0.35 SOLUTION/ DROPS OPHTHALMIC at 21:31

## 2018-12-21 NOTE — PDOC.PN
- Subjective


Encounter Start Date: 12/21/18


Encounter Start Time: 13:35





Ms. Kidd was seen today in follow-up. She still has nausea when she sits up 

in bed. She also feels dizzy upon movement as well. She still has not eaten 

much. 





- Objective


Resuscitation Status - Order Detail:





12/15/18 13:00


Resuscitation Status Routine 


   Resuscitation Status: DNAR: NO Resuscitation


   Discussed with: OOH DNR from NH reviewed in chart








MAR Reviewed: Yes


Vital Signs & Weight: 


 Vital Signs (12 hours)











  Temp Pulse Resp BP Pulse Ox


 


 12/21/18 08:28      97


 


 12/21/18 07:58  97.5 F L  95  16  144/88 H  97








 Weight











Admit Weight                   220 lb


 


Weight                         220 lb














I&O: 


 











 12/20/18 12/21/18 12/22/18





 06:59 06:59 06:59


 


Intake Total 875 1200 


 


Balance 875 1200 











Result Diagrams: 


 12/21/18 07:08





 12/21/18 07:08


Additional Labs: 


 Accuchecks











  12/21/18 12/21/18 12/20/18





  11:28 05:49 21:38


 


POC Glucose  228 H  183 H  229 H














  12/20/18





  16:52


 


POC Glucose  240 H














Phys Exam





- Physical Examination


HEENT: PERRLA


Respiratory: no rales, no rhonchi, wheezing present, clear to auscultation 

bilateral


+ occasional expiratory wheeze


Cardiovascular: RRR, no significant murmur, no rub


Gastrointestinal: soft, non-tender, no distention, positive bowel sounds


Musculoskeletal: pulses present, edema present


+ generalized edema in both upper and lower extremities


Neurological: non-focal





Dx/Plan


(1) Nausea & vomiting


Code(s): R11.2 - NAUSEA WITH VOMITING, UNSPECIFIED   Status: Acute   





(2) UTI (urinary tract infection)


Status: Resolved   Comment: Present on Admission.   





(3) Atrial fibrillation


Code(s): I48.91 - UNSPECIFIED ATRIAL FIBRILLATION   Status: Chronic   


Qualifiers: 


   Atrial fibrillation type: permanent   Qualified Code(s): I48.2 - Chronic 

atrial fibrillation   





(4) Diabetes mellitus type 2 in nonobese


Code(s): E11.9 - TYPE 2 DIABETES MELLITUS WITHOUT COMPLICATIONS   Status: 

Chronic   Comment: controlled   





(5) Hypertension


Code(s): I10 - ESSENTIAL (PRIMARY) HYPERTENSION   Status: Chronic   Comment: 

controlled   





(6) Malnutrition of mild degree


Code(s): E44.1 - MILD PROTEIN-CALORIE MALNUTRITION   Status: Acute   





- Plan





* Nausea and vomiting- she continues to feel nauseated. I would try Valium for 

the vertigo, but she is allergic to this- will give a dose of Meclizine, prior 

to eating


* Continue reglan a few more days as well


* Encourage intake of dietary supplements


* UTI- will discontinue antibiotics


* DM- blood glucose is stable


* AFIB- her heart rate is stable


* Overall she appears very weak and debilitated, without a single identifiable 

cause- ? Failure to Thrive- will check TFT's, and consider back to NH with 

Palliative Care?

## 2018-12-22 RX ADMIN — ONDANSETRON PRN MG: 2 INJECTION INTRAMUSCULAR; INTRAVENOUS at 13:17

## 2018-12-22 RX ADMIN — INSULIN LISPRO PRN UNIT: 100 INJECTION, SOLUTION INTRAVENOUS; SUBCUTANEOUS at 12:20

## 2018-12-22 RX ADMIN — Medication PRN ML: at 13:17

## 2018-12-22 RX ADMIN — Medication SCH ML: at 08:36

## 2018-12-22 RX ADMIN — ASPIRIN SCH MG: 81 TABLET ORAL at 08:34

## 2018-12-22 RX ADMIN — KETOTIFEN FUMARATE SCH DROP: 0.35 SOLUTION/ DROPS OPHTHALMIC at 08:35

## 2018-12-22 RX ADMIN — MULTIPLE VITAMINS W/ MINERALS TAB SCH TAB: TAB at 08:36

## 2018-12-22 RX ADMIN — Medication SCH MG: at 08:33

## 2018-12-22 RX ADMIN — ONDANSETRON PRN MG: 2 INJECTION INTRAMUSCULAR; INTRAVENOUS at 20:42

## 2018-12-22 RX ADMIN — KETOTIFEN FUMARATE SCH DROP: 0.35 SOLUTION/ DROPS OPHTHALMIC at 20:31

## 2018-12-22 RX ADMIN — Medication SCH ML: at 20:31

## 2018-12-22 RX ADMIN — Medication SCH MG: at 20:30

## 2018-12-22 RX ADMIN — INSULIN LISPRO PRN UNIT: 100 INJECTION, SOLUTION INTRAVENOUS; SUBCUTANEOUS at 06:39

## 2018-12-22 NOTE — PDOC.PN
- Subjective


Encounter Start Date: 12/22/18


Encounter Start Time: 11:45


Subjective: Abdominal discomfort..





- Objective


Resuscitation Status - Order Detail:





12/15/18 13:00


Resuscitation Status Routine 


   Resuscitation Status: DNAR: NO Resuscitation


   Discussed with: OOH DNR from NH reviewed in chart








Vital Signs & Weight: 


 Vital Signs (12 hours)











  Temp Pulse Resp BP Pulse Ox


 


 12/22/18 12:18   88   144/85 H 


 


 12/22/18 08:53  98.6 F  88  20  137/90  94 L


 


 12/22/18 08:42      95








 Weight











Admit Weight                   220 lb


 


Weight                         220 lb














I&O: 


 











 12/21/18 12/22/18 12/23/18





 06:59 06:59 06:59


 


Intake Total 1200 1060 


 


Balance 1200 1060 











Result Diagrams: 


 12/21/18 07:08





 12/21/18 07:08


Additional Labs: 


 Accuchecks











  12/22/18 12/22/18 12/21/18





  11:37 06:12 21:27


 


POC Glucose  224 H  190 H  224 H














  12/21/18





  16:28


 


POC Glucose  215 H














Phys Exam





- Physical Examination


Neck: no JVD


Respiratory: clear to auscultation bilateral


Cardiovascular: RRR


Gastrointestinal: soft


Musculoskeletal: edema present





Dx/Plan


(1) Nausea & vomiting


Code(s): R11.2 - NAUSEA WITH VOMITING, UNSPECIFIED   Status: Acute   





(2) Fall at home


Code(s): W19.XXXA - UNSPECIFIED FALL, INITIAL ENCOUNTER; Y92.099 - UNSP PLACE 

IN OTH NON-INSTITUTIONAL RESIDENCE AS PLACE   Status: Acute   





(3) Hyperglycemia due to type 2 diabetes mellitus


Code(s): E11.65 - TYPE 2 DIABETES MELLITUS WITH HYPERGLYCEMIA   Status: Acute   





- Plan


-: Continue supportive therapy..





* .

## 2018-12-23 RX ADMIN — INSULIN LISPRO PRN UNIT: 100 INJECTION, SOLUTION INTRAVENOUS; SUBCUTANEOUS at 11:53

## 2018-12-23 RX ADMIN — ASPIRIN SCH MG: 81 TABLET ORAL at 07:40

## 2018-12-23 RX ADMIN — Medication SCH ML: at 20:21

## 2018-12-23 RX ADMIN — Medication SCH ML: at 07:50

## 2018-12-23 RX ADMIN — KETOTIFEN FUMARATE SCH DROP: 0.35 SOLUTION/ DROPS OPHTHALMIC at 07:50

## 2018-12-23 RX ADMIN — MULTIPLE VITAMINS W/ MINERALS TAB SCH TAB: TAB at 07:41

## 2018-12-23 RX ADMIN — Medication SCH MG: at 20:21

## 2018-12-23 RX ADMIN — Medication SCH MG: at 07:41

## 2018-12-23 RX ADMIN — INSULIN LISPRO PRN UNIT: 100 INJECTION, SOLUTION INTRAVENOUS; SUBCUTANEOUS at 17:48

## 2018-12-23 RX ADMIN — KETOTIFEN FUMARATE SCH DROP: 0.35 SOLUTION/ DROPS OPHTHALMIC at 20:21

## 2018-12-23 NOTE — PDOC.PN
- Subjective


Encounter Start Date: 12/23/18


Encounter Start Time: 15:00


-: No new complaint.


-: Feels better today.





- Objective


Resuscitation Status - Order Detail:





12/15/18 13:00


Resuscitation Status Routine 


   Resuscitation Status: DNAR: NO Resuscitation


   Discussed with: OOH DNR from NH reviewed in chart








Vital Signs & Weight: 


 Weight











Admit Weight                   220 lb


 


Weight                         220 lb














I&O: 


 











 12/22/18 12/23/18 12/24/18





 06:59 06:59 06:59


 


Intake Total 1060 560 


 


Balance 1060 560 











Result Diagrams: 


 12/21/18 07:08





 12/21/18 07:08


Additional Labs: 


 Accuchecks











  12/22/18 12/22/18 12/22/18





  20:48 16:59 11:37


 


POC Glucose  246 H  178 H  224 H














Phys Exam





- Physical Examination


Neck: no JVD


Respiratory: no rales


Cardiovascular: RRR


Gastrointestinal: soft


Musculoskeletal: no edema





Dx/Plan


(1) Nausea & vomiting


Code(s): R11.2 - NAUSEA WITH VOMITING, UNSPECIFIED   Status: Acute   





(2) Fall at home


Code(s): W19.XXXA - UNSPECIFIED FALL, INITIAL ENCOUNTER; Y92.099 - UNSP PLACE 

IN OTH NON-INSTITUTIONAL RESIDENCE AS PLACE   Status: Acute   





(3) Hyperglycemia due to type 2 diabetes mellitus


Code(s): E11.65 - TYPE 2 DIABETES MELLITUS WITH HYPERGLYCEMIA   Status: Acute   





- Plan


-: No acute clinical change..


-: On palliative care.





* .

## 2018-12-24 VITALS — SYSTOLIC BLOOD PRESSURE: 146 MMHG | DIASTOLIC BLOOD PRESSURE: 87 MMHG | TEMPERATURE: 97.4 F

## 2018-12-24 RX ADMIN — INSULIN LISPRO PRN UNIT: 100 INJECTION, SOLUTION INTRAVENOUS; SUBCUTANEOUS at 12:19

## 2018-12-24 RX ADMIN — MULTIPLE VITAMINS W/ MINERALS TAB SCH TAB: TAB at 08:01

## 2018-12-24 RX ADMIN — Medication SCH MG: at 08:00

## 2018-12-24 RX ADMIN — Medication SCH ML: at 08:02

## 2018-12-24 RX ADMIN — ASPIRIN SCH MG: 81 TABLET ORAL at 08:01

## 2018-12-24 RX ADMIN — INSULIN LISPRO PRN UNIT: 100 INJECTION, SOLUTION INTRAVENOUS; SUBCUTANEOUS at 17:11

## 2018-12-24 RX ADMIN — KETOTIFEN FUMARATE SCH DROP: 0.35 SOLUTION/ DROPS OPHTHALMIC at 08:07

## 2018-12-24 RX ADMIN — INSULIN LISPRO PRN UNIT: 100 INJECTION, SOLUTION INTRAVENOUS; SUBCUTANEOUS at 06:28

## 2018-12-24 NOTE — DIS
DATE OF ADMISSION:  12/14/2018



DATE OF DISCHARGE:  12/24/2018



PRIMARY CARE PHYSICIAN:  Eleazar Julio MD.



DISCHARGE DIAGNOSES:  

1. Urinary tract infection.

2. Metabolic encephalopathy.

3. Paroxysmal atrial fibrillation, chronic.

4. Nausea and vomiting, present on admission, resolved.

5. Diabetes mellitus type 2 without mentioned complications.

6. Fall from same level.

7. Hyperglycemia.



CONSULTATIONS:  None.



PROCEDURES:  None.



HISTORY AND PHYSICAL:  Ms. Kidd is an 89-year-old female, who is a resident at

[facility], who presented for nausea, vomiting, and abdominal

pain.  Workup in the emergency department showed her to have urinary tract infection

and acute renal insufficiency, and so we were subsequently called for admit. 



HOSPITAL COURSE:  The patient was seen and examined by Dr. Lowe, and placed on

inpatient status.  She was started on antibiotics and IV fluids.  Overnight 12/15 to

12:16, the patient improved, was having a small amount of anxiety.  She continued on

antibiotics.  PT, OT was requested and nebulizer treatments that she continued to

improve. 



By 12/18, she was doing better.  Case was taken over by Dr. Wagner, and she was

starting to feel better.  She was tolerating a full liquid diet.  Her nausea and

vomiting improved, but did continue to some extent and I thought she might have

gastroparesis.  From 12/18 to 12/22, she slowly improved.  Dr. Hernandez, to go from

12/22 to 12/23, and she continued to improve. 

Today, she ate breakfast with some difficulty, but did better with lunch and she was

sitting up and was stable for discharge back to [facility]. 



DISCHARGE PHYSICAL EXAM:  The patient was seen and examined on the day of discharge.

 Discharge plan and disposition were discussed with the patient face-to-face at the

bedside. 



DISCHARGE MEDICATIONS:  Please see the medicine reconciliation sheet.  No new

medications were started. 



DISCHARGE CONDITION:  Stable.



DISPOSITION:  Discharged back to [facility].



DISCHARGE ACTIVITY:  As tolerated.



DISCHARGE DIET:  Heart healthy diabetic diet recommended.







Job ID:  [id number]

## 2018-12-25 ENCOUNTER — HOSPITAL ENCOUNTER (INPATIENT)
Dept: HOSPITAL 92 - ERS | Age: 83
LOS: 3 days | Discharge: NURSING, REHAB OR LONG-TERM CARE | DRG: 689 | End: 2018-12-28
Attending: INTERNAL MEDICINE | Admitting: INTERNAL MEDICINE
Payer: MEDICARE

## 2018-12-25 VITALS — BODY MASS INDEX: 34.4 KG/M2

## 2018-12-25 DIAGNOSIS — F32.9: ICD-10-CM

## 2018-12-25 DIAGNOSIS — G30.9: ICD-10-CM

## 2018-12-25 DIAGNOSIS — I48.0: ICD-10-CM

## 2018-12-25 DIAGNOSIS — F02.80: ICD-10-CM

## 2018-12-25 DIAGNOSIS — E66.9: ICD-10-CM

## 2018-12-25 DIAGNOSIS — I10: ICD-10-CM

## 2018-12-25 DIAGNOSIS — F41.9: ICD-10-CM

## 2018-12-25 DIAGNOSIS — E78.5: ICD-10-CM

## 2018-12-25 DIAGNOSIS — N39.0: Primary | ICD-10-CM

## 2018-12-25 DIAGNOSIS — J96.01: ICD-10-CM

## 2018-12-25 DIAGNOSIS — J96.02: ICD-10-CM

## 2018-12-25 DIAGNOSIS — I08.1: ICD-10-CM

## 2018-12-25 DIAGNOSIS — Z66: ICD-10-CM

## 2018-12-25 DIAGNOSIS — Z79.01: ICD-10-CM

## 2018-12-25 DIAGNOSIS — G93.41: ICD-10-CM

## 2018-12-25 LAB
ALBUMIN SERPL BCG-MCNC: 3.2 G/DL (ref 3.4–4.8)
ALP SERPL-CCNC: 74 U/L (ref 40–150)
ALT SERPL W P-5'-P-CCNC: 10 U/L (ref 8–55)
ANALYZER IN CARDIO: (no result)
ANION GAP SERPL CALC-SCNC: 11 MMOL/L (ref 10–20)
AST SERPL-CCNC: 10 U/L (ref 5–34)
BASE EXCESS STD BLDA CALC-SCNC: 6.1 MEQ/L
BASOPHILS # BLD AUTO: 0 THOU/UL (ref 0–0.2)
BASOPHILS NFR BLD AUTO: 0.3 % (ref 0–1)
BILIRUB SERPL-MCNC: 0.2 MG/DL (ref 0.2–1.2)
BUN SERPL-MCNC: 11 MG/DL (ref 9.8–20.1)
CA-I BLDA-SCNC: 1.07 MMOL/L (ref 1.12–1.3)
CALCIUM SERPL-MCNC: 9.2 MG/DL (ref 7.8–10.44)
CHLORIDE SERPL-SCNC: 98 MMOL/L (ref 98–107)
CO2 SERPL-SCNC: 37 MMOL/L (ref 23–31)
CREAT CL PREDICTED SERPL C-G-VRATE: 0 ML/MIN (ref 70–130)
CRYSTAL-AUWI FLAG: 0.2 (ref 0–15)
EOSINOPHIL # BLD AUTO: 0.2 THOU/UL (ref 0–0.7)
EOSINOPHIL NFR BLD AUTO: 2.3 % (ref 0–10)
GLOBULIN SER CALC-MCNC: 3.2 G/DL (ref 2.4–3.5)
GLUCOSE SERPL-MCNC: 295 MG/DL (ref 83–110)
GLUCOSE UR STRIP-MCNC: >=1000 MG/DL
HCO3 BLDA-SCNC: 34.1 MEQ/L (ref 22–28)
HCT VFR BLDA CALC: 36 % (ref 36–47)
HEV IGM SER QL: 28.9 (ref 0–7.99)
HGB BLD-MCNC: 13.8 G/DL (ref 12–16)
HGB BLDA-MCNC: 12.3 G/DL (ref 12–16)
HYALINE CASTS #/AREA URNS LPF: (no result) LPF
LIPASE SERPL-CCNC: 14 U/L (ref 8–78)
LYMPHOCYTES # BLD: 1.8 THOU/UL (ref 1.2–3.4)
LYMPHOCYTES NFR BLD AUTO: 22.2 % (ref 21–51)
MCH RBC QN AUTO: 30.1 PG (ref 27–31)
MCV RBC AUTO: 94.9 FL (ref 78–98)
MONOCYTES # BLD AUTO: 1 THOU/UL (ref 0.11–0.59)
MONOCYTES NFR BLD AUTO: 12.7 % (ref 0–10)
NEUTROPHILS # BLD AUTO: 5 THOU/UL (ref 1.4–6.5)
NEUTROPHILS NFR BLD AUTO: 62.5 % (ref 42–75)
PATHC CAST-AUWI FLAG: 1.74 (ref 0–2.49)
PCO2 BLDA: 67.6 MMHG (ref 35–45)
PH BLDA: 7.32 [PH] (ref 7.35–7.45)
PLATELET # BLD AUTO: 231 THOU/UL (ref 130–400)
PO2 BLDA: 77.3 MMHG (ref 60–?)
POTASSIUM BLD-SCNC: 3.28 MMOL/L (ref 3.7–5.3)
POTASSIUM SERPL-SCNC: 4.3 MMOL/L (ref 3.5–5.1)
PROT UR STRIP.AUTO-MCNC: 30 MG/DL
RBC # BLD AUTO: 4.58 MILL/UL (ref 4.2–5.4)
RBC UR QL AUTO: (no result) HPF (ref 0–3)
SODIUM SERPL-SCNC: 142 MMOL/L (ref 136–145)
SP GR UR STRIP: 1.03 (ref 1–1.04)
SPECIMEN DRAWN FROM PATIENT: (no result)
SPERM-AUWI FLAG: 0 (ref 0–9.9)
WBC # BLD AUTO: 8 THOU/UL (ref 4.8–10.8)
YEAST-AUWI FLAG: 0 (ref 0–25)

## 2018-12-25 PROCEDURE — 36415 COLL VENOUS BLD VENIPUNCTURE: CPT

## 2018-12-25 PROCEDURE — 82140 ASSAY OF AMMONIA: CPT

## 2018-12-25 PROCEDURE — 94660 CPAP INITIATION&MGMT: CPT

## 2018-12-25 PROCEDURE — 87040 BLOOD CULTURE FOR BACTERIA: CPT

## 2018-12-25 PROCEDURE — 82805 BLOOD GASES W/O2 SATURATION: CPT

## 2018-12-25 PROCEDURE — 80048 BASIC METABOLIC PNL TOTAL CA: CPT

## 2018-12-25 PROCEDURE — 90670 PCV13 VACCINE IM: CPT

## 2018-12-25 PROCEDURE — 84484 ASSAY OF TROPONIN QUANT: CPT

## 2018-12-25 PROCEDURE — 90471 IMMUNIZATION ADMIN: CPT

## 2018-12-25 PROCEDURE — 93005 ELECTROCARDIOGRAM TRACING: CPT

## 2018-12-25 PROCEDURE — 93306 TTE W/DOPPLER COMPLETE: CPT

## 2018-12-25 PROCEDURE — 74177 CT ABD & PELVIS W/CONTRAST: CPT

## 2018-12-25 PROCEDURE — 71250 CT THORAX DX C-: CPT

## 2018-12-25 PROCEDURE — 83880 ASSAY OF NATRIURETIC PEPTIDE: CPT

## 2018-12-25 PROCEDURE — 80053 COMPREHEN METABOLIC PANEL: CPT

## 2018-12-25 PROCEDURE — 71045 X-RAY EXAM CHEST 1 VIEW: CPT

## 2018-12-25 PROCEDURE — G0009 ADMIN PNEUMOCOCCAL VACCINE: HCPCS

## 2018-12-25 PROCEDURE — 70450 CT HEAD/BRAIN W/O DYE: CPT

## 2018-12-25 PROCEDURE — 96365 THER/PROPH/DIAG IV INF INIT: CPT

## 2018-12-25 PROCEDURE — 51702 INSERT TEMP BLADDER CATH: CPT

## 2018-12-25 PROCEDURE — 81003 URINALYSIS AUTO W/O SCOPE: CPT

## 2018-12-25 PROCEDURE — 83690 ASSAY OF LIPASE: CPT

## 2018-12-25 PROCEDURE — 36416 COLLJ CAPILLARY BLOOD SPEC: CPT

## 2018-12-25 PROCEDURE — 81015 MICROSCOPIC EXAM OF URINE: CPT

## 2018-12-25 PROCEDURE — 85025 COMPLETE CBC W/AUTO DIFF WBC: CPT

## 2018-12-25 NOTE — CT
CT CHEST WITHOUT CONTRAST:

CT ABDOMEN AND PELVIS WITHOUT CONTRAST:

 

HISTORY:

Abdominal pain and chest pain.

 

COMPARISON:

12/17/2018

 

TECHNIQUE:

Multiple contiguous axial images were obtained in a CT of the chest without contrast.  Coronal and sa
gittal reformats were performed.

 

Multiple contiguous axial images were obtained in a CT of the abdomen and pelvis without contrast.  C
oronal and sagittal reformats were performed.

 

FINDINGS:

CHEST:  The heart is at the upper limits of normal in size.  Calcifications are seen in the coronary 
arteries.  No hilar or mediastinal lymphadenopathy is appreciated on this limited noncontrast examina
tion.

 

No focal infiltrates are seen in the lungs.  There are small bilateral pleural effusions with adjacen
t atelectasis.  No pneumothorax is seen.  A calcified granuloma is seen in the right hilar region.

 

The chest wall soft tissues are unremarkable.  The bones in the thorax are unremarkable.

 

ABDOMEN AND PELVIS:  The patient is status post cholecystectomy and hysterectomy.  A Brown catheter d
ecompresses the urinary bladder.  There are exophytic lesions emanating from both kidneys, which may 
represent hyperdense cysts but cannot be assessed without IV contrast.  The largest measures 2 cm in 
size.  There is a hypodensity in the posterior aspect of the superior pole of the left kidney, which 
likely represents a simple cyst.  No calculi are seen in either kidney.  No hydronephrosis is seen.

 

The liver, adrenal glands, spleen, and pancreas are unremarkable, although evaluation is limited with
out IV contrast.

 

The large and small bowel are unremarkable.  No free air, free fluid, or stranding changes are seen i
n the abdomen or pelvis.  No abdominal or pelvic lymphadenopathy is seen.  Atherosclerotic calcificat
ions are seen in the aorta.

 

Degenerative changes are seen in the spine.  The abdominal wall soft tissues are unremarkable.

 

IMPRESSION:

1.  Small bilateral pleural effusions with adjacent atelectasis.

 

2.  Simple left renal cyst with likely bilateral hyperdense renal cysts.  Evaluation of the hyperdens
ities emanating from the kidneys cannot be performed without intravenous contrast.

 

POS: [facility]

## 2018-12-25 NOTE — CT
CT BRAIN WITHOUT CONTRAST:

 

HISTORY:

Altered mental status.

 

COMPARISON:

12/20/2017

 

TECHNIQUE:

Multiple contiguous axial images were obtained in a CT of the abdomen and pelvis without contrast.

 

FINDINGS:

There are scattered hypodensities in the subcortical and periventricular white matter, likely seconda
ry to small vessel ischemic disease.  There is a stable, partially calcified mass in the right fronta
l region, near the falx, measuring 2.5 cm in size.  This most likely represents a meningioma.  There 
is no evidence of hydrocephalus, intracranial hemorrhage, or extraaxial fluid collection.

 

The calvarium and overlying soft tissues are unremarkable.  The visualized paranasal sinuses and mast
oid air cells are well aerated.

 

IMPRESSION:

1.  No evidence of acute intracranial abnormality.

 

2.  Small vessel ischemic disease.

 

3.  Stable right frontal parafalcine meningioma.

 

POS: [facility]

## 2018-12-25 NOTE — RAD
SINGLE VIEW CHEST:

 

HISTORY:

Altered mental status.

 

COMPARISON:

12/14/2018

 

FINDINGS:

Single view of the chest shows an enlarged but stable cardiomediastinal silhouette.  Hazy opacity in 
the right lung base may represent a pleural effusion with adjacent atelectasis.  Surgical clips are s
een in the left axilla.

 

IMPRESSION:

Small right pleural effusion with adjacent atelectasis.

 

POS: [facility]

## 2018-12-26 LAB
ANALYZER IN CARDIO: (no result)
ANION GAP SERPL CALC-SCNC: 14 MMOL/L (ref 10–20)
BASE EXCESS STD BLDA CALC-SCNC: 14.4 MEQ/L
BASOPHILS # BLD AUTO: 0 THOU/UL (ref 0–0.2)
BASOPHILS NFR BLD AUTO: 0.1 % (ref 0–1)
BUN SERPL-MCNC: 10 MG/DL (ref 9.8–20.1)
CA-I BLDA-SCNC: 1.22 MMOL/L (ref 1.12–1.3)
CALCIUM SERPL-MCNC: 9.2 MG/DL (ref 7.8–10.44)
CHLORIDE SERPL-SCNC: 100 MMOL/L (ref 98–107)
CO2 SERPL-SCNC: 33 MMOL/L (ref 23–31)
CREAT CL PREDICTED SERPL C-G-VRATE: 74 ML/MIN (ref 70–130)
EOSINOPHIL # BLD AUTO: 0.2 THOU/UL (ref 0–0.7)
EOSINOPHIL NFR BLD AUTO: 2.1 % (ref 0–10)
GLUCOSE SERPL-MCNC: 218 MG/DL (ref 83–110)
HCO3 BLDA-SCNC: 44 MEQ/L (ref 22–28)
HCT VFR BLDA CALC: 39 % (ref 36–47)
HGB BLD-MCNC: 12.8 G/DL (ref 12–16)
HGB BLDA-MCNC: 13.4 G/DL (ref 12–16)
LYMPHOCYTES # BLD: 2 THOU/UL (ref 1.2–3.4)
LYMPHOCYTES NFR BLD AUTO: 20.9 % (ref 21–51)
MCH RBC QN AUTO: 29.8 PG (ref 27–31)
MCV RBC AUTO: 94 FL (ref 78–98)
MONOCYTES # BLD AUTO: 1.2 THOU/UL (ref 0.11–0.59)
MONOCYTES NFR BLD AUTO: 12.2 % (ref 0–10)
NEUTROPHILS # BLD AUTO: 6.1 THOU/UL (ref 1.4–6.5)
NEUTROPHILS NFR BLD AUTO: 64.7 % (ref 42–75)
O2 A-A PPRESDIFF RESPIRATORY: 33.42 MM[HG] (ref 0–20)
PCO2 BLDA: 82.7 MMHG (ref 35–45)
PH BLDA: 7.34 [PH] (ref 7.35–7.45)
PLATELET # BLD AUTO: 200 THOU/UL (ref 130–400)
PO2 BLDA: 77.1 MMHG (ref 60–?)
POTASSIUM BLD-SCNC: 3.91 MMOL/L (ref 3.7–5.3)
POTASSIUM SERPL-SCNC: 4.4 MMOL/L (ref 3.5–5.1)
RBC # BLD AUTO: 4.28 MILL/UL (ref 4.2–5.4)
SODIUM SERPL-SCNC: 143 MMOL/L (ref 136–145)
SPECIMEN DRAWN FROM PATIENT: (no result)
TROPONIN I SERPL DL<=0.01 NG/ML-MCNC: 0.01 NG/ML (ref ?–0.03)
TROPONIN I SERPL DL<=0.01 NG/ML-MCNC: 0.02 NG/ML (ref ?–0.03)
WBC # BLD AUTO: 9.5 THOU/UL (ref 4.8–10.8)

## 2018-12-26 RX ADMIN — ASPIRIN SCH: 81 TABLET ORAL at 09:00

## 2018-12-26 RX ADMIN — MULTIPLE VITAMINS W/ MINERALS TAB SCH: TAB at 09:13

## 2018-12-26 RX ADMIN — Medication SCH: at 08:59

## 2018-12-26 RX ADMIN — Medication SCH MG: at 20:52

## 2018-12-26 RX ADMIN — INSULIN GLARGINE SCH MLS: 100 INJECTION, SOLUTION SUBCUTANEOUS at 10:24

## 2018-12-26 RX ADMIN — Medication SCH: at 09:09

## 2018-12-26 RX ADMIN — INSULIN GLARGINE SCH MLS: 100 INJECTION, SOLUTION SUBCUTANEOUS at 20:54

## 2018-12-26 NOTE — HP
CHIEF COMPLAINT:  Alteration of awareness and shortness of breath.



HISTORY OF PRESENT ILLNESS:  This is an 89-year-old female with past medical history

of chronic atrial fibrillation, mitral valve prolapse, hyperlipidemia, and

hypertension, presenting from [facility] with alteration of

awareness and some questionable abdominal pain, since the patient has been

constipated for the past 3 days.  In the nursing home, the patient was found to be

desaturating at 75% on room air.  EMS was called, and the patient was placed on

three days of oxygen, and patient's saturation improved to 97.  The patient was

transferred to our hospital.  Of note, the patient was recently in our hospital, and

the patient was diagnosed with urinary tract infection. 



REVIEW OF SYSTEMS:  Review of systems unable to obtain due to patient's mentation.



PAST MEDICAL HISTORY:  

1. Diabetes mellitus type 2.

2. History of mitral valve prolapse.

3. Hypertension.

4. Dyslipidemia.

5. Chronic atrial fibrillation.



FAMILY HISTORY:  Reviewed and noncontributory.



PAST SURGICAL HISTORY:  

1. Cholecystectomy.

2. Left breast mastectomy.

3. Bladder suspension surgery.

4. Appendectomy.



PSYCH HISTORY:  Depression.



SOCIAL HISTORY:  The patient is a permanent resident of nursing home.  The patient

denies any illicit drug use.  The patient does not smoke, and the patient does not

drink any alcohol. 



ALLERGIES:  LEVOFLOXACIN, PENICILLIN, AND VALIUM.



HOME MEDICATIONS:  The patient takes,

1. Gabapentin 400 mg t.i.d.

2. Lasix 40 mg daily.

3. Pepcid 20 mg.

4. Methenamine hippurate 1 g p.o. b.i.d.

5. Multivitamin daily.

6. Losartan 50 mg daily.

7. Lantus 30 units.

8. Tramadol p.r.n.

9. Zofran p.r.n.

10. Cymbalta 30.

11. Baclofen 10 mg.

12. Aspirin 81 mg.

13. Simvastatin 20 mg.

14. Xarelto 10 mg.

15. Isosorbide mononitrate 30 mg.

16. Aricept 10 mg.



PHYSICAL EXAMINATION:

VITAL SIGNS:  The patient's blood pressure is 172/101, pulse is 98, respiratory rate

of 24, temperature of 97.5, oxygen saturation of 100 on 3 L oxygen. 

GENERAL:  The patient is lying in bed, able to state her name, and knows that she is

in a hospital, but not able to tell us why here.  The patient is gasping for air.

The patient seems to be in respiratory distress at this time.  The patient is not

able to speak in full sentences. 

HEENT:  Normocephalic and atraumatic.  Pupils are equally round and reactive to

light.  Extraocular movements are intact.  No scleral icterus.  Mucous membranes are

dry. 

NECK:  Supple.  No JVD.  No carotid bruits.  The patient is not able to move her

neck due to the patient being very lethargic. 

CHEST:  Clear to auscultation bilaterally.  No wheezing, no rales, no rhonchi

appreciated. 

CARDIAC:  Positive S1 and S2.  Regular rate and rhythm.  No murmurs, no gallops, no

rubs appreciated. 

ABDOMEN:  Obese abdomen.  Positive bowel sounds in all quadrants.  No tenderness

appreciated.  No guarding.  No rigidity. 

EXTREMITIES:  The patient is able to move her extremities spontaneously with

commands.  The patient has trace edema at the lower extremities. 

NEUROLOGIC:  Cranial nerves 2 through 12 are grossly intact.  No neurologic deficits

noted.  The patient is alert and oriented x1. 

SKIN:  Pale, dry, and warm.  No rashes appreciated.



LABORATORY DATA:  WBC is 8.0, hemoglobin is 13.8, hematocrit is 43.5, MCV is 94.9,

RDW is 13.0, and platelet count is 231. 



ABGs:  The patient's pH is 7.3, pCO2 is 67.6, and PO2 of 77.3. 



Sodium is 142, potassium is 4.3, chloride is 98, carbon dioxide of 37, anion gap of

11, BUN is 11, creatinine is 0.88, glucose is 295, AST is 10, ALT is 10, and

alkaline phosphatase 74.  BNP is 247.1.  Troponins are 0.022.  Lipase of 14.  UA

shows small leuko esterases. 



ASSESSMENT AND PLAN:  

1. This is an 89-year-old female with multiple comorbidities being admitted for

recurrent urinary tract infection causing acute encephalopathy.  At this point, the

patient has been started on antibiotics.  We will continue the patient on

antibiotics, and we will monitor the patient closely. 

2. Hypoxemic and hypercapnic respiratory failure.  The patient has an elevated pCO2

in her ABGs, confirming hypercapnic respiratory failure.  The patient also had

hypoxemia noted, even though the patient was on 3 L of oxygenation.  The patient's

PO2 was less than 100.  At this point, the patient has been started on BiPAP.  We

are going to admit the patient to the IMCU.  We are going to monitor the patient

closely.  The patient is DNR/DNI, therefore, we cannot be able to intubate the

patient, so we will continue to try with BiPAP to improve the patient's

oxygenations. 

3. History of chronic atrial fibrillation.  We will continue the patient on her home

dose of Xarelto. 

4. Diabetes mellitus type 2.  We will continue the patient on insulin sliding scale.

 I will 

continue the patient on Lantus.

5. Diabetes mellitus type 2, uncontrolled.  We will continue the patient on insulin

sliding scale. 

6. Deep vein thrombosis/gastrointestinal prophylaxis.







Job ID:  [id number]

## 2018-12-26 NOTE — PDOC.PN
- Subjective


Encounter Start Date: 12/26/18


Encounter Start Time: 07:40


-: old records requested/rev





Patient seen and examined. pt is on bipap, No overnight events





- Objective


Resuscitation Status - Order Detail:





12/26/18 01:36


Resuscitation Status Routine 


   Resuscitation Status: DNAR: NO Resuscitation


   Discussed with: in patient's chart








MAR Reviewed: Yes


Vital Signs & Weight: 


 Vital Signs (12 hours)











  Temp Pulse Resp BP Pulse Ox


 


 12/26/18 07:43      96


 


 12/26/18 07:39      97


 


 12/26/18 07:38   75  21 H   97


 


 12/26/18 07:17  97.1 F L  82  20  144/95 H  97


 


 12/26/18 04:00  97.8 F  72  18  134/85  99


 


 12/26/18 02:42      96


 


 12/26/18 01:29   81  14   100


 


 12/26/18 01:25  96.9 F L  78  13  152/114 H  100








 Weight











Weight                         220 lb 3.2 oz














I&O: 


 











 12/25/18 12/26/18 12/27/18





 06:59 06:59 06:59


 


Intake Total  110 


 


Output Total  350 


 


Balance  -240 











Result Diagrams: 


 12/26/18 04:05





 12/26/18 04:05


Additional Labs: 


 Accuchecks











  12/26/18





  05:42


 


POC Glucose  238 H











Radiology Reviewed by me: Yes (CT chest abdomen and pelvis noted)


EKG Reviewed by me: Yes





Phys Exam





- Physical Examination


Constitutional: NAD


on bipap


HEENT: PERRLA, sclera anicteric


Neck: no JVD, supple


coarse sound+, reduced air entry at base


Cardiovascular: no rub, irregular


SM+


Gastrointestinal: soft, no distention, positive bowel sounds


Musculoskeletal: pulses present


trace edema noted


Neurological: moves all 4 limbs


Lymphatic: no nodes


Psychiatric: normal affect


Skin: no rash, normal turgor





Dx/Plan


(1) Acute respiratory failure with hypoxia and hypercarbia


Code(s): J96.01 - ACUTE RESPIRATORY FAILURE WITH HYPOXIA; J96.02 - ACUTE 

RESPIRATORY FAILURE WITH HYPERCAPNIA   Status: Acute   





(2) Hyperlipidemia


Code(s): E78.5 - HYPERLIPIDEMIA, UNSPECIFIED   Status: Chronic   Comment:     





(3) Hypertension


Code(s): I10 - ESSENTIAL (PRIMARY) HYPERTENSION   Status: Chronic   Comment: 

controlled   





(4) PAF (paroxysmal atrial fibrillation)


Code(s): I48.0 - PAROXYSMAL ATRIAL FIBRILLATION   Status: Chronic   





(5) UTI (urinary tract infection)


Status: Acute   





(6) Obesity (BMI 30-39.9)


Code(s): E66.9 - OBESITY, UNSPECIFIED   Status: Chronic   





(7) Anxiety and depression


Code(s): F41.9 - ANXIETY DISORDER, UNSPECIFIED; F32.9 - MAJOR DEPRESSIVE 

DISORDER, SINGLE EPISODE, UNSPECIFIED   Status: Chronic   





(8) Alzheimer's dementia


Code(s): G30.9 - ALZHEIMER'S DISEASE, UNSPECIFIED; F02.80 - DEMENTIA IN OTH 

DISEASES CLASSD ELSWHR W/O BEHAVRL DISTURB   Status: Chronic   





(9) Chronic anticoagulation


Code(s): Z79.01 - LONG TERM (CURRENT) USE OF ANTICOAGULANTS   Status: Chronic   





- Plan


cont current plan of care, continue antibiotics, respiratory therapy





* medication reviewed as below


* symptomatic treatment


* pulmonary consulted


* continue bipap


* continue empiric antibiotics, aztreonam


* prognosis is very poor


* consult palliative care


* continue supportive care.








Review of Systems





- Review of Systems


Other: 





unable to review due to her baseline cognitive deficit





- Medications/Allergies


Allergies/Adverse Reactions: 


 Allergies











Allergy/AdvReac Type Severity Reaction Status Date / Time


 


iodine Allergy   Verified 12/26/18 01:42


 


levofloxacin [From Levaquin] Allergy   Verified 12/26/18 01:42


 


penicillin G Allergy   Verified 12/26/18 01:42


 


codeine AdvReac   Verified 12/26/18 01:42


 


diazepam [From Valium] AdvReac   Verified 12/26/18 01:42











Medications: 


 Current Medications





Acetaminophen (Tylenol)  650 mg PO Q4H PRN


   PRN Reason: Headache/Fever/Mild Pain (1-3)


Al Hydroxide/Mg Hydroxide (Maalox)  30 ml PO Q8H PRN


   PRN Reason: Indigestion


Artificial Tears (Tears Naturale)  2 drop EA EYE PRN PRN


   PRN Reason: Dry Eyes


Ascorbic Acid (Vitamin C)  500 mg PO BID [facility]


   Last Admin: 12/26/18 08:59 Dose:  Not Given


Aspirin (Ecotrin)  81 mg PO QAM [facility]


   Last Admin: 12/26/18 09:00 Dose:  Not Given


Atorvastatin Calcium (Lipitor)  10 mg PO HS [facility]


Baclofen (Lioresal)  10 mg PO TIDPRN PRN


   PRN Reason: muscle spasms


Bisacodyl (Dulcolax)  10 mg PO DAILYPRN PRN


   PRN Reason: Constipation


Calcium Carbonate (Tums)  1,000 mg PO Q4H PRN


   PRN Reason: Heartburn  or Indigestion


Calcium/Vitamin D (Caltrate 600 + Vit D)  1 tab PO DAILY [facility]


   Last Admin: 12/26/18 09:09 Dose:  Not Given


Dextrose/Water (Dextrose 50%)  25 gm SLOW IVP PRN PRN


   PRN Reason: Hypoglycemia


Donepezil HCl (Aricept)  10 mg PO HS [facility]


Duloxetine HCl (Cymbalta)  30 mg PO QPM [facility]


Famotidine (Pepcid)  20 mg PO BID [facility]


   Last Admin: 12/26/18 09:09 Dose:  Not Given


Furosemide (Lasix)  40 mg PO QAM [facility]


   Last Admin: 12/26/18 09:11 Dose:  Not Given


Gabapentin (Neurontin)  400 mg PO TID [facility]


   Last Admin: 12/26/18 09:11 Dose:  Not Given


Glucagon (Glucagon)  1 mg IM PRN PRN


   PRN Reason: Hypoglycemia


Guaifenesin (Robitussin Sf)  200 mg PO Q4H PRN


   PRN Reason: Cough


Guaifenesin/Dextromethorphan (Robitussin Dm)  15 ml PO Q4H PRN


   PRN Reason: Cough


Hydralazine HCl (Apresoline)  10 mg SLOW IVP Q4H PRN


   PRN Reason: SBP > 180 and HR < 70


Dextrose/Water (D5w)  1,000 mls @ 0 mls/hr IV .Q0M PRN


   PRN Reason: Hypoglycemia


Aztreonam 1 gm/ Sodium (Chloride)  100 mls @ 100 mls/hr IVPB 0100,0900,1700 [facility]


   Last Admin: 12/26/18 02:08 Dose:  100 mls


Insulin Glargine 30 units/ (Miscellaneous Medication)  0.3 mls @ 0 mls/hr SC 

BID [facility]


Iron/Minerals/Multivitamins (Theragran M)  1 tab PO DAILY [facility]


   Last Admin: 12/26/18 09:13 Dose:  Not Given


Isosorbide Mononitrate (Imdur Er)  30 mg PO QAM [facility]


   Last Admin: 12/26/18 09:13 Dose:  Not Given


Loperamide HCl (Imodium)  2 mg PO PRN PRN


   PRN Reason: Diarrhea/Loose Stools


Loratadine (Claritin)  10 mg PO DAILYPRN PRN


   PRN Reason: Sinus Symptoms


Losartan Potassium (Cozaar)  50 mg PO QAM [facility]


   Last Admin: 12/26/18 09:13 Dose:  Not Given


Mineral Oil/White Petrolatum (Eucerin Cream)  0 gm TOP BIDPRN PRN


   PRN Reason: Dry Skin


Miscellaneous Medication (Pharmacy To Dose)  1 each IVPB PRN PRN


   PRN Reason: Pharmacy to dose


Ondansetron HCl (Zofran Odt)  4 mg PO Q6H PRN


   PRN Reason: Nausea/Vomiting


Ondansetron HCl (Zofran)  4 mg IVP Q6H PRN


   PRN Reason: Nausea/Vomiting


Polyethylene Glycol (Miralax)  17 gm PO DAILYPRN PRN


   PRN Reason: CONSTIPATION


Rivaroxaban (Xarelto)  10 mg PO 1700 [facility]


Senna/Docusate Sodium (Senokot S)  2 tab PO BIDPRN PRN


   PRN Reason: Constipation


Sodium Chloride (Flush - Normal Saline)  10 ml IVF Q12HR PRN


   PRN Reason: Saline Flush


Sodium Chloride (Flush - Normal Saline)  10 ml IVF PRN PRN


   PRN Reason: Saline Flush


Sodium Chloride (Ocean Nasal Spray 0.65%)  0 ml EA NARE QIDPRN PRN


   PRN Reason: Nasal Congestion


Throat Lozenges (Cepastat Lozenges)  1 nani PO Q2H PRN


   PRN Reason: Sore Throat


Tramadol HCl (Ultram)  50 mg PO Q6H PRN


   PRN Reason: Moderate Pain (4-6)

## 2018-12-26 NOTE — CON
DATE OF CONSULTATION:  12/26/2018



SERVICE:  Pulmonary Medicine.



REASON FOR CONSULTATION:  Respiratory failure.



HISTORY OF PRESENT ILLNESS:  The patient is an 89-year-old white female with past

medical history significant for dementia and debility.  She was in her usual state

of health when she started having some altered mentation.  She presented to the

Emergency Department and was diagnosed with urinary tract infection.  She was given

broad-spectrum antibiotics and placed on IV fluids during her last hospital stay.

Ultimately, she was discharged from our facility 2 days ago.  She returned to the

hospital today with increasing respiratory failure, which she did not have on her

first presentation.  She cannot provide any additional elements of the history

because of her advanced cognitive impairment.  Otherwise, nursing reports no events

overnight.  She is tolerating BiPAP just fine. 



PAST MEDICAL HISTORY:  

1. Dementia, advanced.

2. Atrial fibrillation, chronic.

3. Type 2 diabetes mellitus.

4. Hypertension.

5. Dyslipidemia.

6. Mitral valve prolapse.



PAST SURGICAL HISTORY:  

1. Cholecystectomy.

2. Mastectomy of the left breast.

3. Bladder suspension surgery.

4. Appendectomy.



FAMILY HISTORY:  Noncontributory.



SOCIAL HISTORY:  Negative for alcohol, tobacco, or illicit drug use.  She has no

exposure to chemicals, dust, asbestos, or tuberculosis, and currently, resides in a

nursing facility. 



ALLERGIES:  LEVOFLOXACIN, PENICILLIN, AND VALIUM.



MEDICATIONS:  List of her inpatient medications was reviewed.  Multiple updates were

made at this time. 



REVIEW OF SYSTEMS:  Cannot be obtained as the patient is currently encephalopathic.



PHYSICAL EXAMINATION:

VITAL SIGNS:  Afebrile, pulse 76, blood pressure 171/95, respirations 29, and

saturation 98% via BiPAP.  She has an FiO2 of 21%. 

HEENT:  Normocephalic and atraumatic.  Sclerae white.  Conjunctivae pink.  Oral

mucosa is moist, without lesions. 

LUNGS:  Decent air entry.  There are dependent crackles present.  Decreased air

entry is present bibasilarly.  No prolonged expiratory phase or wheezing is

appreciated. 

HEART:  Normal rate, regular. 

ABDOMEN:  Soft, nontender, and nondistended.  Bowel sounds are positive. 

MUSCULOSKELETAL:  No cyanosis or clubbing.  There is trace to 1+ pitting in the

bilateral lower extremities. 

NEUROLOGIC:  Grossly nonfocal.



LABORATORY DATA:  There are 11 to 20 white blood cells on the urinalysis.  It is

also positive for glycosuria and proteinuria.  Otherwise, there are no significant

issues there.  It is significantly improved compared to prior. 



Blood cultures x2 are unremarkable.  Previous C. diff antigen and toxin were

negative.  Previous blood cultures were also unremarkable. 



IMAGING:  Echocardiogram demonstrates normal ejection fraction.  She is in atrial

fibrillation.  The left ventricle is enlarged and the left atrium is severely

dilated.  Moderate aortic insufficiency is noted.  The RVSP is slightly elevated. 



CT of the brain demonstrates no acute intracranial abnormality. 



CT of the chest, abdomen, and pelvis demonstrates bilateral effusions.  There is

adjacent atelectasis, but no consolidating changes.  I do not see any evidence of

pneumonia.  The abdomen did not have any discrete findings on it that were

suggestive of her acute abdominal discomfort. 



ASSESSMENT:  

1. Metabolic encephalopathy.

2. Chronic hypercapnic respiratory failure.

3. Acute hypoxic respiratory failure.

4. Dementia, advanced.

5. Permanent atrial fibrillation.



DISCUSSION AND PLAN:  The patient is likely encephalopathic from a combination of

hypercapnia and hypoxemia.  We will give her some acetazolamide to help with her

severely elevated bicarb.  We will continue her BiPAP through the night.  We will

also diurese her through time.  Antibiotics will be suspended altogether.  I see no

evidence of an acute infectious process occurring here.  We will give her breaks

from BiPAP starting tomorrow 3 times daily and increase as tolerated.  Ultimately, a

palliative discussion needs to be had as the patient has advanced comorbidities, and

is developing increasing weakness, deconditioning, and protein-calorie malnutrition

associated with her advanced dementia.  We will keep her in the IMCU overnight.

Pulmonary/Critical Care will continue to follow along. 







Job ID:  [id number]

## 2018-12-27 RX ADMIN — INSULIN GLARGINE SCH MLS: 100 INJECTION, SOLUTION SUBCUTANEOUS at 21:30

## 2018-12-27 RX ADMIN — MULTIPLE VITAMINS W/ MINERALS TAB SCH TAB: TAB at 09:42

## 2018-12-27 RX ADMIN — Medication SCH MG: at 09:42

## 2018-12-27 RX ADMIN — INSULIN GLARGINE SCH MLS: 100 INJECTION, SOLUTION SUBCUTANEOUS at 09:48

## 2018-12-27 RX ADMIN — Medication SCH MG: at 20:54

## 2018-12-27 RX ADMIN — ASPIRIN SCH MG: 81 TABLET ORAL at 09:42

## 2018-12-27 RX ADMIN — Medication SCH TAB: at 09:42

## 2018-12-27 NOTE — PDOC.PN
- Subjective


Encounter Start Date: 12/27/18


Encounter Start Time: 10:15





Patient seen and examined. No new complaints. No overnight events





pt is off bipap, doing well today





- Objective


Resuscitation Status - Order Detail:





12/26/18 01:36


Resuscitation Status Routine 


   Resuscitation Status: DNAR: NO Resuscitation


   Discussed with: in patient's chart








MAR Reviewed: Yes


Vital Signs & Weight: 


 Vital Signs (12 hours)











  Temp Pulse Resp BP Pulse Ox


 


 12/27/18 10:51  97.9 F  85  17  130/79  98


 


 12/27/18 08:00      94 L


 


 12/27/18 07:32      96


 


 12/27/18 07:19  97.0 F L  80  18  128/76  98


 


 12/27/18 04:00  97.4 F L  73  15  97/79  97








 Weight











Weight                         221 lb 12.8 oz














I&O: 


 











 12/26/18 12/27/18 12/28/18





 06:59 06:59 06:59


 


Intake Total 110 1600 


 


Output Total 350 3450 


 


Balance -240 -1850 











Result Diagrams: 


 12/26/18 04:05





 12/26/18 04:05


Additional Labs: 


 Accuchecks











  12/27/18 12/27/18 12/26/18





  10:28 06:03 20:52


 


POC Glucose  259 H  125 H  144 H














  12/26/18





  16:21


 


POC Glucose  147 H











EKG Reviewed by me: Yes (afib)





Phys Exam





- Physical Examination


Constitutional: NAD


HEENT: PERRLA, moist MMs, sclera anicteric


Neck: no JVD, supple


Respiratory: no wheezing, no rales, no rhonchi


reduced air entry


Cardiovascular: irregular


SM+


Gastrointestinal: soft, non-tender, no distention, positive bowel sounds


obesity


Musculoskeletal: no edema, pulses present


Neurological: non-focal, normal sensation


Lymphatic: no nodes


Psychiatric: normal affect


Skin: no rash, normal turgor





Dx/Plan


(1) Acute respiratory failure with hypoxia and hypercarbia


Code(s): J96.01 - ACUTE RESPIRATORY FAILURE WITH HYPOXIA; J96.02 - ACUTE 

RESPIRATORY FAILURE WITH HYPERCAPNIA   Status: Acute   





(2) Hyperlipidemia


Code(s): E78.5 - HYPERLIPIDEMIA, UNSPECIFIED   Status: Chronic   Comment:     





(3) Hypertension


Code(s): I10 - ESSENTIAL (PRIMARY) HYPERTENSION   Status: Chronic   Comment: 

controlled   





(4) PAF (paroxysmal atrial fibrillation)


Code(s): I48.0 - PAROXYSMAL ATRIAL FIBRILLATION   Status: Chronic   





(5) Moderate mitral regurgitation


Code(s): I34.0 - NONRHEUMATIC MITRAL (VALVE) INSUFFICIENCY   Status: Acute   





(6) Severe tricuspid regurgitation


Code(s): I07.1 - RHEUMATIC TRICUSPID INSUFFICIENCY   Status: Acute   





(7) UTI (urinary tract infection)


Status: Acute   





(8) Alzheimer's dementia


Code(s): G30.9 - ALZHEIMER'S DISEASE, UNSPECIFIED; F02.80 - DEMENTIA IN OTH 

DISEASES CLASSD ELSWHR W/O BEHAVRL DISTURB   Status: Chronic   





(9) Anxiety and depression


Code(s): F41.9 - ANXIETY DISORDER, UNSPECIFIED; F32.9 - MAJOR DEPRESSIVE 

DISORDER, SINGLE EPISODE, UNSPECIFIED   Status: Chronic   





(10) Chronic anticoagulation


Code(s): Z79.01 - LONG TERM (CURRENT) USE OF ANTICOAGULANTS   Status: Chronic   





(11) Obesity (BMI 30-39.9)


Code(s): E66.9 - OBESITY, UNSPECIFIED   Status: Chronic   





- Plan


cont current plan of care, continue antibiotics, respiratory therapy





* medication reviewed as below


* symptomatic treatment


* macrobid 100 mg po bid


* pt is now off bipap, she is doing ok, will consider transfer to medical if 

pulmonary ok.








Review of Systems





- Review of Systems


ENT: negative: Ear Pain, Ear Discharge, Nose Pain, Nose Discharge, Nose 

Congestion, Mouth Pain, Mouth Swelling, Throat Pain, Throat Swelling, Other


Respiratory: negative: Cough, Dry, Shortness of Breath, Hemoptysis, SOB with 

Excertion, Pleuritic Pain, Sputum, Wheezing


Cardiovascular: negative: chest pain, palpitations, orthopnea, paroxysmal 

nocturnal dyspnea, edema, light headedness, other


Gastrointestinal: negative: Nausea, Vomiting, Abdominal Pain, Diarrhea, 

Constipation, Melena, Hematochezia, Other


Genitourinary: negative: Dysuria, Frequency, Incontinence, Hematuria, Retention

, Other


Musculoskeletal: negative: Neck Pain, Shoulder Pain, Arm Pain, Back Pain, Hand 

Pain, Leg Pain, Foot Pain, Other





- Medications/Allergies


Allergies/Adverse Reactions: 


 Allergies











Allergy/AdvReac Type Severity Reaction Status Date / Time


 


iodine Allergy   Verified 12/26/18 01:42


 


levofloxacin [From Levaquin] Allergy   Verified 12/26/18 01:42


 


penicillin G Allergy   Verified 12/26/18 01:42


 


codeine AdvReac   Verified 12/26/18 01:42


 


diazepam [From Valium] AdvReac   Verified 12/26/18 01:42











Medications: 


 Current Medications





Acetaminophen (Tylenol)  650 mg PO Q4H PRN


   PRN Reason: Headache/Fever/Mild Pain (1-3)


Al Hydroxide/Mg Hydroxide (Maalox)  30 ml PO Q8H PRN


   PRN Reason: Indigestion


Artificial Tears (Tears Naturale)  2 drop EA EYE PRN PRN


   PRN Reason: Dry Eyes


Ascorbic Acid (Vitamin C)  500 mg PO BID [facility]


   Last Admin: 12/27/18 09:42 Dose:  500 mg


Aspirin (Ecotrin)  81 mg PO QAM [facility]


   Last Admin: 12/27/18 09:42 Dose:  81 mg


Atorvastatin Calcium (Lipitor)  10 mg PO HS [facility]


   Last Admin: 12/26/18 20:52 Dose:  10 mg


Baclofen (Lioresal)  10 mg PO TIDPRN PRN


   PRN Reason: muscle spasms


Bisacodyl (Dulcolax)  10 mg PO DAILYPRN PRN


   PRN Reason: Constipation


Calcium Carbonate (Tums)  1,000 mg PO Q4H PRN


   PRN Reason: Heartburn  or Indigestion


Calcium/Vitamin D (Caltrate 600 + Vit D)  1 tab PO DAILY [facility]


   Last Admin: 12/27/18 09:42 Dose:  1 tab


Dextrose/Water (Dextrose 50%)  25 gm SLOW IVP PRN PRN


   PRN Reason: Hypoglycemia


Donepezil HCl (Aricept)  10 mg PO HS [facility]


   Last Admin: 12/26/18 20:53 Dose:  10 mg


Duloxetine HCl (Cymbalta)  30 mg PO QPM [facility]


   Last Admin: 12/26/18 20:53 Dose:  30 mg


Famotidine (Pepcid)  20 mg PO BID [facility]


   Last Admin: 12/27/18 09:42 Dose:  20 mg


Furosemide (Lasix)  40 mg SLOW IVP DAILY [facility]


   Last Admin: 12/27/18 09:41 Dose:  40 mg


Gabapentin (Neurontin)  400 mg PO TID [facility]


   Last Admin: 12/27/18 09:42 Dose:  400 mg


Glucagon (Glucagon)  1 mg IM PRN PRN


   PRN Reason: Hypoglycemia


Guaifenesin (Robitussin Sf)  200 mg PO Q4H PRN


   PRN Reason: Cough


Hydralazine HCl (Apresoline)  10 mg SLOW IVP Q4H PRN


   PRN Reason: SBP > 180 and HR < 70


Dextrose/Water (D5w)  1,000 mls @ 0 mls/hr IV .Q0M PRN


   PRN Reason: Hypoglycemia


Insulin Glargine 30 units/ (Miscellaneous Medication)  0.3 mls @ 0 mls/hr SC 

BID [facility]


   Last Admin: 12/27/18 09:48 Dose:  0.3 mls


Dextrose/Water (D5w)  1,000 mls @ 50 mls/hr IV .Q20H [facility]


   Last Admin: 12/26/18 16:11 Dose:  1,000 mls


Iron/Minerals/Multivitamins (Theragran M)  1 tab PO DAILY [facility]


   Last Admin: 12/27/18 09:42 Dose:  1 tab


Isosorbide Mononitrate (Imdur Er)  30 mg PO QAM [facility]


   Last Admin: 12/27/18 09:42 Dose:  30 mg


Loperamide HCl (Imodium)  2 mg PO PRN PRN


   PRN Reason: Diarrhea/Loose Stools


Loratadine (Claritin)  10 mg PO DAILYPRN PRN


   PRN Reason: Sinus Symptoms


[facility]artan Potassium (Cozaar)  [facility]


   Last Admin: 12/27/18 09:48 Dose:  50 mg


Mineral Oil/White Petrolatum (Eucerin Cream)  0 gm TOP BIDPRN PRN


   PRN Reason: Dry Skin


Miscellaneous Medication (Pharmacy To Dose)  1 each IVPB PRN PRN


   PRN Reason: Pharmacy to dose


Ondansetron HCl (Zofran Odt)  4 mg PO Q6H PRN


   PRN Reason: Nausea/Vomiting


Ondansetron HCl (Zofran)  4 mg IVP Q6H PRN


   PRN Reason: Nausea/Vomiting


Polyethylene Glycol (Miralax)  17 gm PO DAILYPRN PRN


   PRN Reason: CONSTIPATION


Rivaroxaban (Xarelto)  10 mg PO 1700 [facility]


   Last Admin: 12/26/18 16:11 Dose:  Not Given


Senna/Docusate Sodium (Senokot S)  2 tab PO BIDPRN PRN


   PRN Reason: Constipation


Sodium Chloride (Flush - Normal Saline)  10 ml IVF Q12HR PRN


   PRN Reason: Saline Flush


Sodium Chloride (Flush - Normal Saline)  10 ml IVF PRN PRN


   PRN Reason: Saline Flush


Sodium Chloride (Ocean Nasal Spray 0.65%)  0 ml EA NARE QIDPRN PRN


   PRN Reason: Nasal Congestion


Throat Lozenges (Cepastat Lozenges)  1 nani PO Q2H PRN


   PRN Reason: Sore Throat


Tramadol HCl (Ultram)  50 mg PO Q6H PRN


   PRN Reason: Moderate Pain (4-6)

## 2018-12-27 NOTE — PRG
DATE OF SERVICE:  12/27/2018



SERVICE:  Pulmonary Medicine.



INTERVAL HISTORY:  The patient is doing okay from respiratory standpoint.  Mentation

has improved a little bit.  She denies any current fevers or chills.  She is not

coughing up any sputum.  Otherwise, she is returning to her usual state of health. 



PHYSICAL EXAMINATION:

VITAL SIGNS:  Afebrile, pulse 85, blood pressure 130/79, respirations 17, and

saturation 98% on room air. 

GENERAL:  The patient is awake and alert, in no apparent distress. 

LUNGS:  Excellent air entry.  There is no prolonged expiratory phase or wheezing

present. 

HEART:  Normal rate regular. 

ABDOMEN:  Soft, nontender, and nondistended.  Bowel sounds are positive. 

MUSCULOSKELETAL:  No cyanosis or clubbing.  No pitting in the bilateral lower

extremities. 

NEUROLOGIC:  Grossly nonfocal.



LABORATORY DATA:  Blood sugar is ranging from 125 to 259.  Blood cultures x2 are

unremarkable. 



ASSESSMENT:  

1. Metabolic encephalopathy, resolving.

2. Chronic hypercapnic respiratory failure.

3. Acute hypoxic respiratory failure, resolved.

4. Dementia, advanced.

5. Atrial fibrillation, permanent.



DISCUSSION AND PLAN:  From my perspective, the patient is stable for transition out

of the hospital.  We will get a basic metabolic profile and magnesium in the

morning.  She can be transitioned to the [facility].  We can give her a holiday from the

[facility]. 





Job ID:  [id number]

## 2018-12-28 VITALS — SYSTOLIC BLOOD PRESSURE: 124 MMHG | DIASTOLIC BLOOD PRESSURE: 77 MMHG | TEMPERATURE: 97.9 F

## 2018-12-28 RX ADMIN — ASPIRIN SCH MG: 81 TABLET ORAL at 08:24

## 2018-12-28 RX ADMIN — MULTIPLE VITAMINS W/ MINERALS TAB SCH TAB: TAB at 08:25

## 2018-12-28 RX ADMIN — Medication SCH MG: at 08:24

## 2018-12-28 RX ADMIN — INSULIN HUMAN PRN UNIT: 100 INJECTION, SOLUTION PARENTERAL at 18:08

## 2018-12-28 RX ADMIN — INSULIN GLARGINE SCH MLS: 100 INJECTION, SOLUTION SUBCUTANEOUS at 08:25

## 2018-12-28 RX ADMIN — Medication SCH TAB: at 08:25

## 2018-12-28 RX ADMIN — INSULIN HUMAN PRN UNIT: 100 INJECTION, SOLUTION PARENTERAL at 13:50

## 2018-12-28 NOTE — DIS
DATE OF ADMISSION:  12/25/2018



DATE OF DISCHARGE:  12/28/2018



PRIMARY CARE PHYSICIAN:  Dr. Ceci Harris.



DISCHARGE DISPOSITION:  Skilled nursing unit.



PRIMARY DISCHARGE DIAGNOSES:  

1. Acute metabolic encephalopathy, improved.

2. Acute respiratory failure with hypoxia and hypercapnia.

3. Carbon dioxide narcosis.

4. Urinary tract infection.



SECONDARY DISCHARGE DIAGNOSES:  

1. Paroxysmal atrial fibrillation.

2. Obesity with BMI 34.

3. Hypertension.

4. Dyslipidemia.

5. Chronic anticoagulation.

6. Anxiety and depression.

7. Alzheimer dementia.

8. Severe tricuspid regurgitation.

9. Moderate mitral regurgitation.



PRIMARY PROCEDURE/OPERATION:  None.



RADIOLOGICAL INVESTIGATION:  CT brain on admission showed chronic ischemic white

matter changes, stable; parafalcine meningioma, right frontal lobe. 



CT chest, abdomen and pelvis showed bilateral pleural effusion. 



Echocardiography during this admission showed severe tricuspid regurgitation,

pulmonary hypertension, moderate mitral regurgitation. 



SIGNIFICANT LABORATORY DATA:  WBC 9.5, hemoglobin 12.8, platelet 200, pCO2 82.7 on

admission.  Sodium 143, potassium 4.4, BUN 10, creatinine 0.81, calcium 9.2.

Urinalysis suggestive of UTI.  Blood culture, negative. 



DISCHARGE MEDICATION:  Following our scheduled medication,

1. Vitamin C 500 mg p.o. b.i.d.

2. Aspirin 81 mg daily.

3. Calcium with Vitamin D one tablet daily.

4. Cranberry tablet 450 mg b.i.d.

5. Aricept 10 mg p.o. at bedtime.

6. Cymbalta 90 mg p.o. daily.

7. Pepcid 20 mg p.o. b.i.d.

8. Lasix 40 mg daily.

9. Lantus insulin 30 units subcu b.i.d.

10. Imdur 30 mg p.o. daily.

11. Losartan 50 mg p.o. daily.

12. Multivitamin one tablet p.o. daily.

13. Xarelto 10 mg daily.

14. Zocor 20 mg p.o. at bedtime.

15. Gabapentin 100 mg t.i.d.

16. Hiprex 1 g p.o. b.i.d.

17. Macrobid 100 mg p.o. b.i.d. for 5 more days.



CONTRAINDICATION:  None.



CODE STATUS:  DNR.



INPATIENT CONSULTANT:  Dr. Chacko, pulmonologist, was following while in the

hospital. 



ALLERGIES:  IODINE, LEVAQUIN, PENICILLIN, CODEINE, DIAZEPAM.



DISCHARGE PLAN:  Post hospital, the patient is planned to discharge to skilled

nursing home. 



HOSPITAL COURSE:  An 89-year-old female who was sent from nursing home for altered

mental status.  Her altered mental status was related with CO2 narcosis from

metabolic encephalopathy.  She had acute respiratory failure with hypoxia and

hypercapnia; ABG confirmed that diagnosis.  This patient has chronic respiratory

failure as well.  This patient's current presentation we suspected from sedatives

and that might have precipitated her CO2 narcosis.  During this admission, we

adjusted above-mentioned medication.  The patient was also given her home

medication.  Her urinalysis was suggestive of UTI and that is why she was given

initially antibiotic therapy; on discharge, we changed to p.o. Macrobid.

Echocardiography during this admission showed moderate mitral regurgitation and

severe tricuspid regurgitation with pulmonary hypertension.  This patient will

benefit from oxygen therapy during nighttime or on exertion as much as possible to

keep the saturation in normal range.  She may benefit from a sleep study and

possible CPAP machine.  This patient is advised to follow up with pulmonologist.

While in the hospital, we did CT brain, CT chest, abdomen and pelvis CT scan which

were unremarkable. 



The patient initially admitted to IMCU.  We treated her with BiPAP.  Subsequently,

BiPAP was discontinued.  The patient's condition improved.  Her encephalopathy

resolved.  The patient was transferred to medical floor.  She was DNR while in the

hospital. 



The patient is seen and examined at bedside today.  Please see my progress note from

today for further detail.  Paperwork for discharge done.  Discharge medication

reconciliation done. 



Total time spent on discharge day, 31 minutes.







Job ID:  [id number]

## 2018-12-28 NOTE — PDOC.PN
- Subjective


Encounter Start Date: 12/28/18


Encounter Start Time: 07:10





Patient seen and examined. No new complaints. No overnight events





- Objective


Resuscitation Status - Order Detail:





12/26/18 01:36


Resuscitation Status Routine 


   Resuscitation Status: DNAR: NO Resuscitation


   Discussed with: in patient's chart








MAR Reviewed: Yes


Vital Signs & Weight: 


 Vital Signs (12 hours)











  Temp Pulse Resp BP Pulse Ox


 


 12/28/18 07:31  97.7 F  73  18  125/75  92 L


 


 12/28/18 04:00  97.5 F L  76  18  131/79  98


 


 12/28/18 00:00  97.6 F  75  18  110/67  97








 Weight











Weight                         221 lb 12.8 oz














I&O: 


 











 12/27/18 12/28/18 12/29/18





 06:59 06:59 06:59


 


Intake Total 1600 2580 


 


Output Total 3450 2400 1200


 


Balance -1850 180 -1200











Result Diagrams: 


 12/26/18 04:05





 12/26/18 04:05


Additional Labs: 


 Accuchecks











  12/28/18 12/27/18 12/27/18





  04:34 20:22 16:40


 


POC Glucose  205 H  286 H  255 H














  12/27/18





  10:28


 


POC Glucose  259 H














Phys Exam





- Physical Examination


Constitutional: NAD


HEENT: PERRLA, moist MMs, sclera anicteric


Neck: no JVD, supple


Respiratory: no wheezing, no rales, no rhonchi


Cardiovascular: RRR, no significant murmur, no rub


Gastrointestinal: soft, non-tender, no distention, positive bowel sounds


Musculoskeletal: no edema, pulses present


Neurological: non-focal, normal sensation


Lymphatic: no nodes


Psychiatric: normal affect


Skin: no rash, normal turgor





Dx/Plan


(1) Acute respiratory failure with hypoxia and hypercarbia


Code(s): J96.01 - ACUTE RESPIRATORY FAILURE WITH HYPOXIA; J96.02 - ACUTE 

RESPIRATORY FAILURE WITH HYPERCAPNIA   Status: Acute   





(2) Hyperlipidemia


Code(s): E78.5 - HYPERLIPIDEMIA, UNSPECIFIED   Status: Chronic   Comment:     





(3) Hypertension


Code(s): I10 - ESSENTIAL (PRIMARY) HYPERTENSION   Status: Chronic   Comment: 

controlled   





(4) PAF (paroxysmal atrial fibrillation)


Code(s): I48.0 - PAROXYSMAL ATRIAL FIBRILLATION   Status: Chronic   





(5) Moderate mitral regurgitation


Code(s): I34.0 - NONRHEUMATIC MITRAL (VALVE) INSUFFICIENCY   Status: Acute   





(6) Severe tricuspid regurgitation


Code(s): I07.1 - RHEUMATIC TRICUSPID INSUFFICIENCY   Status: Acute   





(7) UTI (urinary tract infection)


Status: Acute   





(8) Alzheimer's dementia


Code(s): G30.9 - ALZHEIMER'S DISEASE, UNSPECIFIED; F02.80 - DEMENTIA IN OTH 

DISEASES CLASSD ELSWHR W/O BEHAVRL DISTURB   Status: Chronic   





(9) Anxiety and depression


Code(s): F41.9 - ANXIETY DISORDER, UNSPECIFIED; F32.9 - MAJOR DEPRESSIVE 

DISORDER, SINGLE EPISODE, UNSPECIFIED   Status: Chronic   





(10) Chronic anticoagulation


Code(s): Z79.01 - LONG TERM (CURRENT) USE OF ANTICOAGULANTS   Status: Chronic   





(11) Obesity (BMI 30-39.9)


Code(s): E66.9 - OBESITY, UNSPECIFIED   Status: Chronic   





- Plan


cont current plan of care, continue antibiotics, PT/OT, 





* medication reviewed as below


* symptomatic treatment


* see my discharge cindi.








Review of Systems





- Review of Systems


ENT: negative: Ear Pain, Ear Discharge, Nose Pain, Nose Discharge, Nose 

Congestion, Mouth Pain, Mouth Swelling, Throat Pain, Throat Swelling, Other


Respiratory: negative: Cough, Dry, Shortness of Breath, Hemoptysis, SOB with 

Excertion, Pleuritic Pain, Sputum, Wheezing


Cardiovascular: negative: chest pain, palpitations, orthopnea, paroxysmal 

nocturnal dyspnea, edema, light headedness, other


Gastrointestinal: negative: Nausea, Vomiting, Abdominal Pain, Diarrhea, 

Constipation, Melena, Hematochezia, Other


Genitourinary: negative: Dysuria, Frequency, Incontinence, Hematuria, Retention

, Other


Musculoskeletal: negative: Neck Pain, Shoulder Pain, Arm Pain, Back Pain, Hand 

Pain, Leg Pain, Foot Pain, Other





- Medications/Allergies


Allergies/Adverse Reactions: 


 Allergies











Allergy/AdvReac Type Severity Reaction Status Date / Time


 


iodine Allergy   Verified 12/26/18 01:42


 


levofloxacin [From Levaquin] Allergy   Verified 12/26/18 01:42


 


penicillin G Allergy   Verified 12/26/18 01:42


 


codeine AdvReac   Verified 12/26/18 01:42


 


diazepam [From Valium] AdvReac   Verified 12/26/18 01:42











Medications: 


 Current Medications





Acetaminophen (Tylenol)  650 mg PO Q4H PRN


   PRN Reason: Headache/Fever/Mild Pain (1-3)


Al Hydroxide/Mg Hydroxide (Maalox)  30 ml PO Q8H PRN


   PRN Reason: Indigestion


Artificial Tears (Tears Naturale)  2 drop EA EYE PRN PRN


   PRN Reason: Dry Eyes


Ascorbic Acid (Vitamin C)  500 mg PO BID [facility]


   Last Admin: 12/28/18 08:24 Dose:  500 mg


Aspirin (Ecotrin)  81 mg PO QAM [facility]


   Last Admin: 12/28/18 08:24 Dose:  81 mg


Atorvastatin Calcium (Lipitor)  10 mg PO HS [facility]


   Last Admin: 12/27/18 20:55 Dose:  10 mg


Baclofen (Lioresal)  10 mg PO TIDPRN PRN


   PRN Reason: muscle spasms


Bisacodyl (Dulcolax)  10 mg PO DAILYPRN PRN


   PRN Reason: Constipation


Calcium Carbonate (Tums)  1,000 mg PO Q4H PRN


   PRN Reason: Heartburn  or Indigestion


Calcium/Vitamin D (Caltrate 600 + Vit D)  1 tab PO DAILY [facility]


   Last Admin: 12/28/18 08:25 Dose:  1 tab


Dextrose/Water (Dextrose 50%)  25 gm SLOW IVP PRN PRN


   PRN Reason: Hypoglycemia


Donepezil HCl (Aricept)  10 mg PO HS [facility]


   Last Admin: 12/27/18 20:55 Dose:  10 mg


Duloxetine HCl (Cymbalta)  30 mg PO QPM [facility]


   Last Admin: 12/27/18 20:55 Dose:  30 mg


Famotidine (Pepcid)  20 mg PO BID [facility]


   Last Admin: 12/28/18 08:24 Dose:  20 mg


Furosemide (Lasix)  40 mg SLOW IVP DAILY [facility]


   Last Admin: 12/28/18 08:25 Dose:  40 mg


Gabapentin (Neurontin)  400 mg PO TID [facility]


   Last Admin: 12/28/18 08:25 Dose:  400 mg


Glucagon (Glucagon)  1 mg IM PRN PRN


   PRN Reason: Hypoglycemia


Guaifenesin (Robitussin Sf)  200 mg PO Q4H PRN


   PRN Reason: Cough


Hydralazine HCl (Apresoline)  10 mg SLOW IVP Q4H PRN


   PRN Reason: SBP > 180 and HR < 70


Dextrose/Water (D5w)  1,000 mls @ 0 mls/hr IV .Q0M PRN


   PRN Reason: Hypoglycemia


Insulin Glargine 30 units/ (Miscellaneous Medication)  0.3 mls @ 0 mls/hr SC 

BID [facility]


   Last Admin: 12/28/18 08:25 Dose:  0.3 mls


Dextrose/Water (D5w)  1,000 mls @ 50 mls/hr IV .Q20H [facility]


   Last Admin: 12/27/18 22:20 Dose:  1,000 mls


Iron/Minerals/Multivitamins (Theragran M)  1 tab PO DAILY [facility]


   Last Admin: 12/28/18 08:25 Dose:  1 tab


Isosorbide Mononitrate (Imdur Er)  30 mg PO QAM [facility]


   Last Admin: 12/28/18 08:25 Dose:  30 mg


Loperamide HCl (Imodium)  2 mg PO PRN PRN


   PRN Reason: Diarrhea/Loose Stools


Loratadine (Claritin)  10 mg PO DAILYPRN PRN


   PRN Reason: Sinus Symptoms


Losartan Potassium (Cozaar)  [facility]


   Last Admin: 12/28/18 08:24 Dose:  50 mg


Mineral Oil/White Petrolatum (Eucerin Cream)  0 gm TOP BIDPRN PRN


   PRN Reason: Dry Skin


Miscellaneous Medication (Pharmacy To Dose)  1 each IVPB PRN PRN


   PRN Reason: Pharmacy to dose


Nitrofurantoin Macrocrystals (Macrobid)  100 mg PO BID [facility]


   Last Admin: 12/28/18 08:24 Dose:  100 mg


Ondansetron HCl (Zofran Odt)  4 mg PO Q6H PRN


   PRN Reason: Nausea/Vomiting


Ondansetron HCl (Zofran)  4 mg IVP Q6H PRN


   PRN Reason: Nausea/Vomiting


Polyethylene Glycol (Miralax)  17 gm PO DAILYPRN PRN


   PRN Reason: CONSTIPATION


Rivaroxaban (Xarelto)  10 mg PO 1700 [facility]


   Last Admin: 12/27/18 18:49 Dose:  10 mg


Senna/Docusate Sodium (Senokot S)  2 tab PO BIDPRN PRN


   PRN Reason: Constipation


Sodium Chloride (Flush - Normal Saline)  10 ml IVF Q12HR PRN


   PRN Reason: Saline Flush


Sodium Chloride (Flush - Normal Saline)  10 ml IVF PRN PRN


   PRN Reason: Saline Flush


Sodium Chloride (Ocean Nasal Spray 0.65%)  0 ml EA NARE QIDPRN PRN


   PRN Reason: Nasal Congestion


Throat Lozenges (Cepastat Lozenges)  1 nani PO Q2H PRN


   PRN Reason: Sore Throat


Tramadol HCl (Ultram)  50 mg PO Q6H PRN


   PRN Reason: Moderate Pain (4-6)


   Last Admin: 12/27/18 20:54 Dose:  50 mg

## 2018-12-28 NOTE — PRG
DATE OF SERVICE:  12/28/2018



SERVICE:  Pulmonary Medicine.



INTERVAL HISTORY:  The patient indicates that she is not having any discomforts.

She denies having shortness of breath, fevers, or chills.  She is not having any

chest discomfort currently.  Otherwise, she is very pleased to see me.  She does not

exactly know where she is or what the situation is.  She cannot provide me with any

overnight events that occurred.  Nursing reports no overnight events. 



OBJECTIVE:  VITAL SIGNS:  Afebrile, pulse 83, blood pressure 132/85, respirations

16, and saturation 99% on room air. 

GENERAL:  The patient is awake and alert, in no apparent distress. 

LUNGS:  Decent air entry.  There is a slightly prolonged expiratory phase.  No

wheezing or rhonchi are appreciated. 

HEART:  Normal rate, regular. 

ABDOMEN:  Soft, nontender, and nondistended.  Bowel sounds are positive. 

MUSCULOSKELETAL:  No cyanosis or clubbing.  There is no pitting in bilateral lower

extremities. 

NEUROLOGIC:  Grossly nonfocal.



LABORATORY DATA:  Sugar ranges from 147 to 286.  Blood cultures x2 remain

unremarkable. 



ASSESSMENT:  

1. Metabolic encephalopathy, resolved to baseline.

2. Chronic hypercapnic respiratory failure.

3. Acute hypoxic respiratory failure, resolved.

4. Dementia, advanced.

5. Permanent atrial fibrillation.



DISCUSSION AND PLAN:  The patient is once again returned to baseline.  Because of

her advanced cognitive impairment, it is going to be very challenging to keep her

from getting to upper volume down through time.  That being said, from a respiratory 

perspective, she has returned to baseline.  There is nothing to do for chronic

hypercapnic respiratory failure.  She would not be able to tolerate BiPAP long-term.

 If she remains in-house, Pulmonary will continue to follow, but from my

perspective, she has returned to baseline and can be transitioned back to her

nursing home. 







Job ID:  [id number]

## 2018-12-29 NOTE — EKG
Test Reason : 

Blood Pressure : ***/*** mmHG

Vent. Rate : 107 BPM     Atrial Rate : 326 BPM

   P-R Int : 000 ms          QRS Dur : 086 ms

    QT Int : 328 ms       P-R-T Axes : 000 016 047 degrees

   QTc Int : 437 ms

 

Atrial fibrillation with rapid ventricular response

Nonspecific ST and T wave abnormality , probably digitalis effect

Abnormal ECG

 

Confirmed by MICHAEL COSBY ([id number]),  EDGARD CONSTANTINO ([id number]) on 12/29/2018 5:42:54 PM

 

Referred By:             Confirmed By:MICHAEL COSBY

## 2019-01-30 ENCOUNTER — HOSPITAL ENCOUNTER (INPATIENT)
Dept: HOSPITAL 92 - ERS | Age: 84
LOS: 7 days | Discharge: NURSING, REHAB OR LONG-TERM CARE | DRG: 871 | End: 2019-02-06
Attending: HOSPITALIST | Admitting: HOSPITALIST
Payer: MEDICARE

## 2019-01-30 VITALS — BODY MASS INDEX: 33 KG/M2

## 2019-01-30 DIAGNOSIS — N18.3: ICD-10-CM

## 2019-01-30 DIAGNOSIS — E86.0: ICD-10-CM

## 2019-01-30 DIAGNOSIS — E66.9: ICD-10-CM

## 2019-01-30 DIAGNOSIS — N39.0: ICD-10-CM

## 2019-01-30 DIAGNOSIS — I12.9: ICD-10-CM

## 2019-01-30 DIAGNOSIS — G93.41: ICD-10-CM

## 2019-01-30 DIAGNOSIS — N17.9: ICD-10-CM

## 2019-01-30 DIAGNOSIS — F32.9: ICD-10-CM

## 2019-01-30 DIAGNOSIS — G30.9: ICD-10-CM

## 2019-01-30 DIAGNOSIS — A41.9: Primary | ICD-10-CM

## 2019-01-30 DIAGNOSIS — Z79.01: ICD-10-CM

## 2019-01-30 DIAGNOSIS — E78.5: ICD-10-CM

## 2019-01-30 DIAGNOSIS — J96.02: ICD-10-CM

## 2019-01-30 DIAGNOSIS — I48.91: ICD-10-CM

## 2019-01-30 DIAGNOSIS — R65.21: ICD-10-CM

## 2019-01-30 DIAGNOSIS — I48.0: ICD-10-CM

## 2019-01-30 DIAGNOSIS — Z66: ICD-10-CM

## 2019-01-30 DIAGNOSIS — F41.9: ICD-10-CM

## 2019-01-30 DIAGNOSIS — J96.01: ICD-10-CM

## 2019-01-30 DIAGNOSIS — E87.3: ICD-10-CM

## 2019-01-30 DIAGNOSIS — F02.80: ICD-10-CM

## 2019-01-30 DIAGNOSIS — I07.1: ICD-10-CM

## 2019-01-30 LAB
ALBUMIN SERPL BCG-MCNC: 3.8 G/DL (ref 3.4–4.8)
ALP SERPL-CCNC: 124 U/L (ref 40–150)
ALT SERPL W P-5'-P-CCNC: 30 U/L (ref 8–55)
ANALYZER IN CARDIO: (no result)
ANALYZER IN CARDIO: (no result)
ANION GAP SERPL CALC-SCNC: 13 MMOL/L (ref 10–20)
AST SERPL-CCNC: 28 U/L (ref 5–34)
BASE EXCESS STD BLDA CALC-SCNC: 1.6 MEQ/L
BASE EXCESS STD BLDA CALC-SCNC: 6.9 MEQ/L
BASOPHILS # BLD AUTO: 0.1 THOU/UL (ref 0–0.2)
BASOPHILS NFR BLD AUTO: 0.7 % (ref 0–1)
BILIRUB SERPL-MCNC: 0.4 MG/DL (ref 0.2–1.2)
BUN SERPL-MCNC: 22 MG/DL (ref 9.8–20.1)
CA-I BLDA-SCNC: 1.15 MMOL/L (ref 1.12–1.3)
CA-I BLDA-SCNC: 1.22 MMOL/L (ref 1.12–1.3)
CALCIUM SERPL-MCNC: 10 MG/DL (ref 7.8–10.44)
CHLORIDE SERPL-SCNC: 99 MMOL/L (ref 98–107)
CO2 SERPL-SCNC: 37 MMOL/L (ref 23–31)
CREAT CL PREDICTED SERPL C-G-VRATE: 0 ML/MIN (ref 70–130)
EOSINOPHIL # BLD AUTO: 0.3 THOU/UL (ref 0–0.7)
EOSINOPHIL NFR BLD AUTO: 4.7 % (ref 0–10)
GLOBULIN SER CALC-MCNC: 3.7 G/DL (ref 2.4–3.5)
GLUCOSE SERPL-MCNC: 150 MG/DL (ref 83–110)
HCO3 BLDA-SCNC: 29.2 MEQ/L (ref 22–28)
HCO3 BLDA-SCNC: 36.3 MEQ/L (ref 22–28)
HCT VFR BLDA CALC: 41 % (ref 36–47)
HCT VFR BLDA CALC: 46 % (ref 36–47)
HGB BLD-MCNC: 16.1 G/DL (ref 12–16)
HGB BLDA-MCNC: 13.8 G/DL (ref 12–16)
HGB BLDA-MCNC: 15.6 G/DL (ref 12–16)
LYMPHOCYTES # BLD: 3.2 THOU/UL (ref 1.2–3.4)
LYMPHOCYTES NFR BLD AUTO: 46.3 % (ref 21–51)
MCH RBC QN AUTO: 29.7 PG (ref 27–31)
MCV RBC AUTO: 94.5 FL (ref 78–98)
MONOCYTES # BLD AUTO: 0.7 THOU/UL (ref 0.11–0.59)
MONOCYTES NFR BLD AUTO: 9.4 % (ref 0–10)
NEUTROPHILS # BLD AUTO: 2.7 THOU/UL (ref 1.4–6.5)
NEUTROPHILS NFR BLD AUTO: 38.9 % (ref 42–75)
O2 A-A PPRESDIFF RESPIRATORY: 11.37 MM[HG] (ref 0–20)
O2 A-A PPRESDIFF RESPIRATORY: 75.06 MM[HG] (ref 0–20)
PCO2 BLDA: 59.1 MMHG (ref 35–45)
PCO2 BLDA: 73.5 MMHG (ref 35–45)
PH BLDA: 7.31 [PH] (ref 7.35–7.45)
PH BLDA: 7.31 [PH] (ref 7.35–7.45)
PLATELET # BLD AUTO: 163 THOU/UL (ref 130–400)
PO2 BLDA: 50.7 MMHG (ref 60–?)
PO2 BLDA: 96.4 MMHG (ref 60–?)
POTASSIUM BLD-SCNC: 3.74 MMOL/L (ref 3.7–5.3)
POTASSIUM BLD-SCNC: 4.23 MMOL/L (ref 3.7–5.3)
POTASSIUM SERPL-SCNC: 4.5 MMOL/L (ref 3.5–5.1)
RBC # BLD AUTO: 5.44 MILL/UL (ref 4.2–5.4)
SODIUM SERPL-SCNC: 144 MMOL/L (ref 136–145)
SP GR UR STRIP: 1.02 (ref 1–1.04)
SPECIMEN DRAWN FROM PATIENT: (no result)
SPECIMEN DRAWN FROM PATIENT: (no result)
WBC # BLD AUTO: 7 THOU/UL (ref 4.8–10.8)

## 2019-01-30 PROCEDURE — 87804 INFLUENZA ASSAY W/OPTIC: CPT

## 2019-01-30 PROCEDURE — 83880 ASSAY OF NATRIURETIC PEPTIDE: CPT

## 2019-01-30 PROCEDURE — A4353 INTERMITTENT URINARY CATH: HCPCS

## 2019-01-30 PROCEDURE — 83605 ASSAY OF LACTIC ACID: CPT

## 2019-01-30 PROCEDURE — 93005 ELECTROCARDIOGRAM TRACING: CPT

## 2019-01-30 PROCEDURE — 51701 INSERT BLADDER CATHETER: CPT

## 2019-01-30 PROCEDURE — 80048 BASIC METABOLIC PNL TOTAL CA: CPT

## 2019-01-30 PROCEDURE — 94640 AIRWAY INHALATION TREATMENT: CPT

## 2019-01-30 PROCEDURE — 96365 THER/PROPH/DIAG IV INF INIT: CPT

## 2019-01-30 PROCEDURE — 74150 CT ABDOMEN W/O CONTRAST: CPT

## 2019-01-30 PROCEDURE — 94660 CPAP INITIATION&MGMT: CPT

## 2019-01-30 PROCEDURE — 36556 INSERT NON-TUNNEL CV CATH: CPT

## 2019-01-30 PROCEDURE — 84484 ASSAY OF TROPONIN QUANT: CPT

## 2019-01-30 PROCEDURE — 71045 X-RAY EXAM CHEST 1 VIEW: CPT

## 2019-01-30 PROCEDURE — 96366 THER/PROPH/DIAG IV INF ADDON: CPT

## 2019-01-30 PROCEDURE — 82805 BLOOD GASES W/O2 SATURATION: CPT

## 2019-01-30 PROCEDURE — 5A09357 ASSISTANCE WITH RESPIRATORY VENTILATION, LESS THAN 24 CONSECUTIVE HOURS, CONTINUOUS POSITIVE AIRWAY PRESSURE: ICD-10-PCS | Performed by: HOSPITALIST

## 2019-01-30 PROCEDURE — S0028 INJECTION, FAMOTIDINE, 20 MG: HCPCS

## 2019-01-30 PROCEDURE — 36415 COLL VENOUS BLD VENIPUNCTURE: CPT

## 2019-01-30 PROCEDURE — 36416 COLLJ CAPILLARY BLOOD SPEC: CPT

## 2019-01-30 PROCEDURE — 87040 BLOOD CULTURE FOR BACTERIA: CPT

## 2019-01-30 PROCEDURE — 96368 THER/DIAG CONCURRENT INF: CPT

## 2019-01-30 PROCEDURE — 87086 URINE CULTURE/COLONY COUNT: CPT

## 2019-01-30 PROCEDURE — 83735 ASSAY OF MAGNESIUM: CPT

## 2019-01-30 PROCEDURE — 96360 HYDRATION IV INFUSION INIT: CPT

## 2019-01-30 PROCEDURE — 81003 URINALYSIS AUTO W/O SCOPE: CPT

## 2019-01-30 PROCEDURE — 80053 COMPREHEN METABOLIC PANEL: CPT

## 2019-01-30 PROCEDURE — 84100 ASSAY OF PHOSPHORUS: CPT

## 2019-01-30 PROCEDURE — 85025 COMPLETE CBC W/AUTO DIFF WBC: CPT

## 2019-01-30 NOTE — RAD
FRONTAL VIEW CHEST:

 

Date:  01/30/19 

 

COMPARISON:  

12/25/18. 

 

INDICATION:

Emergency exam, hypoxia. 

 

FINDINGS:

There is enlargement of the cardiac silhouette. No lobar consolidation, discrete pneumothorax, or eff
usion identified. The chin and neck overlie the upper lung zones, medially, limiting visualization in
 this regard. Metallic clips are seen at the left axilla. 

 

IMPRESSION: 

1.  Evidence of CHF, although improved from 12/25/18. 

2.  No new, lobar consolidation identified. 

 

 

POS: DEMAR[facility]

## 2019-01-31 LAB
ANION GAP SERPL CALC-SCNC: 16 MMOL/L (ref 10–20)
BASOPHILS # BLD AUTO: 0.1 THOU/UL (ref 0–0.2)
BASOPHILS NFR BLD AUTO: 0.8 % (ref 0–1)
BUN SERPL-MCNC: 23 MG/DL (ref 9.8–20.1)
CALCIUM SERPL-MCNC: 8.9 MG/DL (ref 7.8–10.44)
CHLORIDE SERPL-SCNC: 103 MMOL/L (ref 98–107)
CO2 SERPL-SCNC: 29 MMOL/L (ref 23–31)
CREAT CL PREDICTED SERPL C-G-VRATE: 51 ML/MIN (ref 70–130)
EOSINOPHIL # BLD AUTO: 0.3 THOU/UL (ref 0–0.7)
EOSINOPHIL NFR BLD AUTO: 3.6 % (ref 0–10)
GLUCOSE SERPL-MCNC: 163 MG/DL (ref 83–110)
HGB BLD-MCNC: 14 G/DL (ref 12–16)
LYMPHOCYTES # BLD: 3 THOU/UL (ref 1.2–3.4)
LYMPHOCYTES NFR BLD AUTO: 33.6 % (ref 21–51)
MCH RBC QN AUTO: 30.1 PG (ref 27–31)
MCV RBC AUTO: 94.6 FL (ref 78–98)
MONOCYTES # BLD AUTO: 0.9 THOU/UL (ref 0.11–0.59)
MONOCYTES NFR BLD AUTO: 10.4 % (ref 0–10)
NEUTROPHILS # BLD AUTO: 4.6 THOU/UL (ref 1.4–6.5)
NEUTROPHILS NFR BLD AUTO: 51.6 % (ref 42–75)
PLATELET # BLD AUTO: 154 THOU/UL (ref 130–400)
POTASSIUM SERPL-SCNC: 3.9 MMOL/L (ref 3.5–5.1)
RBC # BLD AUTO: 4.66 MILL/UL (ref 4.2–5.4)
SODIUM SERPL-SCNC: 144 MMOL/L (ref 136–145)
WBC # BLD AUTO: 9 THOU/UL (ref 4.8–10.8)

## 2019-01-31 RX ADMIN — FAMOTIDINE SCH MG: 10 INJECTION, SOLUTION INTRAVENOUS at 09:07

## 2019-01-31 RX ADMIN — MEROPENEM AND SODIUM CHLORIDE SCH MLS: 1 INJECTION, SOLUTION INTRAVENOUS at 09:07

## 2019-01-31 RX ADMIN — MEROPENEM AND SODIUM CHLORIDE SCH MLS: 1 INJECTION, SOLUTION INTRAVENOUS at 20:48

## 2019-01-31 RX ADMIN — FAMOTIDINE SCH MG: 10 INJECTION, SOLUTION INTRAVENOUS at 20:22

## 2019-01-31 RX ADMIN — VANCOMYCIN HYDROCHLORIDE SCH MLS: 1 INJECTION, SOLUTION INTRAVENOUS at 11:04

## 2019-01-31 RX ADMIN — Medication SCH MG: at 20:22

## 2019-01-31 NOTE — PDOC.PN
- Subjective


Encounter Start Date: 01/31/19


Encounter Start Time: 08:20





Pt seen for followup re: acute hypercapnic and hypoxic respiratory failure.  Pt 

opening eyes but not answering questions, unable to complete ROS.





- Objective


Resuscitation Status - Order Detail:





01/30/19 22:59


Resuscitation Status Routine 


   Resuscitation Status: DNAR: NO Resuscitation


   Discussed with: discussed with poa by er physician








MAR Reviewed: Yes


Vital Signs & Weight: 


 Vital Signs (12 hours)











  Temp Pulse Resp Pulse Ox


 


 01/31/19 12:00  98.8 F   


 


 01/31/19 08:10     100


 


 01/31/19 08:05     100


 


 01/31/19 08:00  97.8 F   


 


 01/31/19 03:00  97.7 F   


 


 01/31/19 02:14   79  15  100








 Weight











Weight                         211 lb 3.245 oz











 Most Recent Monitor Data











Heart Rate from ECG            81


 


NIBP                           108/62


 


NIBP BP-Mean                   77


 


Respiration from ECG           26


 


SpO2                           100














I&O: 


 











 01/30/19 01/31/19 02/01/19





 06:59 06:59 06:59


 


Intake Total  31 100


 


Output Total  271 210


 


Balance  -240 -110











Result Diagrams: 


 01/31/19 03:26





 01/31/19 03:26


EKG Reviewed by me: Yes (Tele:  NSR)





Phys Exam





- Physical Examination


Obese


HEENT: moist MMs, sclera anicteric, oral pharynx no lesions, 2+ tonsils


Respiratory: no wheezing, no rales, no rhonchi, clear to auscultation bilateral


Cardiovascular: RRR, no rub


S1, s2


Gastrointestinal: soft, non-tender, no distention, positive bowel sounds


Neurological: moves all 4 limbs


Psychiatric: normal affect


Deviation from normal: Unable to assess orientation to person, place or time





Dx/Plan


(1) Acute respiratory failure with hypoxia and hypercarbia


Code(s): J96.01 - ACUTE RESPIRATORY FAILURE WITH HYPOXIA; J96.02 - ACUTE 

RESPIRATORY FAILURE WITH HYPERCAPNIA   Status: Acute   Comment: Improved, pt 

was treated with BiPAP   





(2) Acute metabolic encephalopathy


Code(s): G93.41 - METABOLIC ENCEPHALOPATHY   Status: Acute   Comment: likely 

secondary to hypercapnia   





(3) Acute worsening of stage 3 chronic kidney disease


Code(s): N18.3 - CHRONIC KIDNEY DISEASE, STAGE 3 (MODERATE)   Status: Acute   

Comment: creatinine improved to 1.13 today   





(4) Alzheimer's dementia


Code(s): G30.9 - ALZHEIMER'S DISEASE, UNSPECIFIED; F02.80 - DEMENTIA IN OTH 

DISEASES CLASSD ELSWHR W/O BEHAVRL DISTURB   Status: Chronic   Comment: 

continue Aricept   





(5) Hypertension


Code(s): I10 - ESSENTIAL (PRIMARY) HYPERTENSION   Status: Chronic   Comment: 

controlled   





(6) Hyperlipidemia


Code(s): E78.5 - HYPERLIPIDEMIA, UNSPECIFIED   Status: Chronic   Comment:     





(7) PAF (paroxysmal atrial fibrillation)


Code(s): I48.0 - PAROXYSMAL ATRIAL FIBRILLATION   Status: Chronic   





(8) Septic shock


Code(s): A41.9 - SEPSIS, UNSPECIFIED ORGANISM; R65.21 - SEVERE SEPSIS WITH 

SEPTIC SHOCK   Status: Resolved   





- Plan





* .








Review of Systems





- Medications/Allergies


Allergies/Adverse Reactions: 


 Allergies











Allergy/AdvReac Type Severity Reaction Status Date / Time


 


iodine Allergy   Verified 12/26/18 01:42


 


levofloxacin [From Levaquin] Allergy   Verified 12/26/18 01:42


 


penicillin G Allergy   Verified 12/26/18 01:42


 


codeine AdvReac   Verified 12/26/18 01:42


 


diazepam [From Valium] AdvReac   Verified 12/26/18 01:42











Medications: 


 Current Medications





Acetaminophen (Tylenol)  650 mg PO Q4H PRN


   PRN Reason: Headache/Fever/Mild Pain (1-3)


Enoxaparin Sodium (Lovenox)  30 mg SC 2100 ZAHIRA


Famotidine (Pepcid)  20 mg SLOW IVP BID [facility], [facility]


   Last Admin: 01/31/19 09:07 Dose:  20 mg


Vancomycin HCl 1 gm/ Device  200 mls @ 200 mls/hr IVPB 1200 ZAHIRA


   Last Admin: 01/31/19 11:04 Dose:  200 mls


Meropenem 1 gm/ Device  50 mls @ 100 mls/hr IVPB 0900,2100 ZAHIRA


   Last Admin: 01/31/19 09:07 Dose:  50 mls


Sodium Chloride (1/2 Normal Saline)  1,000 mls @ 100 mls/hr IV .Q10H ZAHIRA


   Stop: 02/01/19 04:44


   Last Admin: 01/31/19 09:07 Dose:  1,000 mls


Miscellaneous Medication (Pharmacy To Dose)  1 each IVPB PRN PRN


   PRN Reason: EMPIRIC


Ondansetron HCl (Zofran)  4 mg IVP Q6H PRN


   PRN Reason: Nausea/Vomiting

## 2019-01-31 NOTE — CON
DATE OF CONSULTATION:  01/31/2019



TIME SPENT:  This is 35 minutes critical care time.



CONSULTING PHYSICIAN:  Dr. Quezada.



REASON FOR CONSULTATION:  ICU placement.



HISTORY OF PRESENT ILLNESS:  The patient is an 89-year-old female, who was brought

in last night from a nursing home.  She is able to nod/shake her head, but is not

able to add much to history.  She apparently was sent over for altered mental status

and low O2 sats.  She appears profoundly dehydrated.  She was briefly on BiPAP last

night, but that has been stopped this morning.  She continues to be hypotensive, but

is off the Levophed. 



PAST MEDICAL HISTORY:  

1. Advanced age.

2. Chronic atrial fibrillation.

3. Dementia.

4. Type-2 diabetes mellitus.

5. Hypertension.

6. Hyperlipidemia.

7. Mitral valve prolapse.



PAST SURGICAL HISTORY:  

1. Cholecystectomy.

2. Mastectomy, left breast.

3. Bladder suspension.

4. Appendectomy.



FAMILY MEDICAL HISTORY:  Unremarkable.



SOCIAL HISTORY:  Nonsmoker.  Does not consume alcohol.  Does not use illicit drugs.

No history of exposure. 



ALLERGIES:  LEVAQUIN, PENICILLIN, AND VALIUM.



MEDICATIONS:  Prior to admission, these were reviewed and listed in the summary

section on [facility].  Current inpatient medications, these were reviewed and listed

in the summary section on [facility]. 



REVIEW OF SYSTEMS:  Cannot be obtained because the patient is nonverbal.



PHYSICAL EXAMINATION:

VITAL SIGNS:  Temperature 97.8, pulse 85, blood pressure 108/70, O2 saturation 100%. 

GENERAL:  She is awake, does not appear to be in any distress. 

HEENT:  Oropharynx is dry. 

NECK:  No adenopathy or JVD. 

LUNGS:  Clear to auscultation without wheezing or rhonchi. 

CARDIAC:  S1, S2.  Slightly tachycardic without murmur. 

ABDOMEN:  Soft, nontender to palpation. 

EXTREMITIES:  No clubbing, cyanosis, or edema.



LABORATORY AND DIAGNOSTIC DATA:  Sodium 144, potassium 3.9, chloride 103, CO2 of 29,

BUN 23, creatinine 1.1, and glucose 163.  White blood cell count 9, hematocrit 44,

platelet count 154.  Last night, pH was 7.31, pCO2 of 59, pO2 of 51. 



Her chest x-ray shows cardiomegaly without evidence of infiltrate.



ASSESSMENT:  

1. This patient appears profoundly dry.  This may be secondary to just poor intake.

Possibility exists that she could be septic.  In truth, it is just difficult to

tell. 

2. Transient acute respiratory failure, resolved.

3. Hypotension.



RECOMMENDATIONS:  

1. I will go ahead and start her back on some IV fluids, but limit it to 2 L.

2. Continue the antibiotics.

3. BiPAP intermittently as needed.

4. Start subcu Lovenox for the time being until she is able to resume her Xarelto.

5. She is a DNR/DNI status.







Job ID:  [id number]

## 2019-01-31 NOTE — HP
PRIMARY CARE DOCTOR:  Dr. Inga Henderson.



CODE STATUS:  DNR/DNI.  Continued medical treatment.  This has been verified by 
the

ER doctor with the power of  of the patient. 



TIME OF EVALUATION:  11:00 p.m.



CHIEF COMPLAINT:  Hypoxia and change in mental status.



HISTORY OF PRESENT ILLNESS:  Information has been gathered from the ER 
physician and

nursing staff.  The patient is nonverbal to my examination. 



HISTORY OF PRESENT ILLNESS:  This is an 89-year-old female patient with past 
medical

history of GERD; CHF; atrial flutter; diabetes; UTI; dementia; atrial 
fibrillation;

dysphagia; high cholesterol; history of brain mass, status post radiation; 
breast

cancer; hypertension; rheumatic tricuspid insufficiency; bradycardia that was

brought into the hospital.  She lives in a nursing home because the patient was

having hypoxia with saturation in the 80s on room air, altered mental status.

Symptoms started insidiously in the past few hours and the patient was found to 
have

saturation in the 82s.  For that reason, EMS was called.  The patient was 
placed in

oxygen, was not improving.  In the ER, the patient has continued to deteriorate.

The patient is on BiPAP.  Blood pressure dropped and the patient is now on 
Levophed.

 Goals of care have been discussed by the ER physician with the power of 


from the family and decision is to continue medical treatment for now without

intubation or resuscitation.  Okay to vasopressors.  If the patient deteriorates
,

goals of care might be reconsidered.  Symptoms are severe.  No clear triggers,

alleviating factors. 



REVIEW OF SYSTEMS:  Unable to obtain.  The patient is nonverbal.



PAST MEDICAL HISTORY:  As mentioned in the HPI.



PAST SURGICAL HISTORY:  Cholecystectomy, mastectomy of the left breast, bladder

suspension, surgical history of appendectomy, surgical history of hysterectomy. 



PSYCH HISTORY:  Includes depression, dementia, history of suicide attempts by

overdose. 



SOCIAL HISTORY:  No alcohol use.  No drug use.  No smoking history.  Lives at

[facility]. 



FAMILY HISTORY:  Includes coronary artery disease and diabetes.



ALLERGIES:  KNOWN ALLERGIES TO CODEINE, IODINE, LEVAQUIN, PENICILLIN, VALIUM.



REPORTED MEDICATIONS:  Acetaminophen, Aricept, aspirin, baclofen, calcium, 
vitamin

D, Centrum Silver, cranberry, duloxetine, famotidine, furosemide, gabapentin,

isosorbide mononitrate, Lantus, losartan, methamphetamine hippurate, Xarelto, 
Zocor,

vitamin C. 



PHYSICAL EXAMINATION:

VITAL SIGNS:  On presentation, blood pressure 104/71 with heart rate 97, 
respiratory

rate was 20, temperature 97.8.  Pain was 0/10.  Oxygen saturation was 93% on 2 L

oxygen.  The patient was needing BiPAP due to continually worsening mental 
status

and CO2 retention. 

GENERAL APPEARANCE:  The patient is nonverbal, disoriented, noncommunicative, 
not in

acute distress. 

HEENT:  Eyes; normal conjunctivae.  Dry oral mucosa. 

NECK:  No JVD. 

RESPIRATORY:  Bilateral air entry.  No rales.  No wheezing.  Symmetric 
expansion. 

CARDIOVASCULAR:  Normal rate, regular rhythm.  No murmurs.  No gallop.  No 
edema. 

ABDOMEN:  Soft.  Normal bowel sounds. 

MUSCULOSKELETAL:  Baseline range of motion and strength.  No tenderness. 

SKIN:  Warm, intact.  No pallor.  No rash.  No redness. 

VASCULAR:  Peripheral pulses are present.  Capillary refill seems to intact. 

NEUROLOGIC:  The patient is lethargic, unable to fully explore. 

PSYCHIATRIC:  Unable to explore.



DIAGNOSTIC DATA:  EKG was reviewed.  The patient has atrial fibrillation with a 
rate

of 100, QRS 90, QT corrected 423, nonspecific Q-wave abnormalities.  Chest x-
ray was

reviewed.  The patient has evidence of CHF, although improved from 12/25/2018.  
No

new lobar consolidation identified. 



LABORATORY DATA:  Labs were reviewed.  The patient has white count of 7.0,

hemoglobin 16.1, MCV 94.5, platelet count of 163.  Blood gas, pH 7.31 with pCO2 
of

59, PO2 of 50, this was on BiPAP on mechanical rate of 10, inspired oxygen 28,

pressure support of 10, PEEP of 5.  Chemistry; sodium 144, potassium 4.5, 
chloride

99, carbon dioxide 37, anion gap 13, BUN 22, creatinine 1.24.  In previous

admissions, the creatinine was 0.8.  GFR 41, glucose 150.  Lactic acid 1.1.  
LFTs

were negative.  Troponin was negative.  Urine was done, was negative. 



ASSESSMENT AND PLAN:  The patient will be placed in the hospital with following

medical problems: 

1. Acute encephalopathy, unclear etiology, maybe underlying sepsis.  We will 
treat

the underlying etiology. 

2. Acute hypoxic respiratory failure.  The patient has been found with hypoxia,

needing initially CO2 on nasal cannula, but then the patient went to hypercapnic

respiratory failure and is needing BiPAP.  Blood gases improved, however, 
mentation

remains the same. 

3. Possible acute kidney injury.  The patient has a creatinine of 1.24.  On 
previous

admission, it was 0.8.  We will monitor, would be cardiorenal.  We will adjust

treatment accordingly.  This is not a big change from previous abnormal values. 

4. Uncontrolled diabetes with blood sugar 150, reconcile home medications, we 
will

place the patient on a sliding scale.  The patient is n.p.o. for now due to 
risk of

aspiration. 

5. History of congestive heart failure.  Chest x-ray is negative for congestive

heart failure.  This is a chronic, seems to be stable at this point.  We will

monitor:  Might need treatment adjustment for fluid balance at this point. 

6. Possible underlying infection.  Patient will be started on broad-spectrum

antibiotics as source could be the lungs.  We will monitor, we will adjust 
treatment

accordingly. 

7. History of dementia.  Patient will need supportive care as inpatient.

8. Hyperlipidemia, low-cholesterol diet is advised.  We will reconcile home

medications. 

9. History of atrial fibrillation, this is a chronic problem, rate is controlled
,

reconcile home medications.  We will treat accordingly. 

10. Uncontrolled hypertension.  Actually the patient is in septic shock, on

Levophed.  We will restart the home medications for now. 

11. The patient is in shock, possibilities were sepsis, hypoxia on presentation 
and

change in mental status.  The patient is placed on Levophed.  Family okay with

vasopressors.  No intubation or resuscitation.  We will continue with treatment 
for

now.  Followup cultures.  Adjust treatment as needed. 

12. Deep venous thrombosis prophylaxis.







Job ID:  [id number]



[facility]

## 2019-02-01 LAB
ANION GAP SERPL CALC-SCNC: 12 MMOL/L (ref 10–20)
BASOPHILS # BLD AUTO: 0 THOU/UL (ref 0–0.2)
BASOPHILS NFR BLD AUTO: 0.6 % (ref 0–1)
BUN SERPL-MCNC: 15 MG/DL (ref 9.8–20.1)
CALCIUM SERPL-MCNC: 8.4 MG/DL (ref 7.8–10.44)
CHLORIDE SERPL-SCNC: 103 MMOL/L (ref 98–107)
CO2 SERPL-SCNC: 26 MMOL/L (ref 23–31)
CREAT CL PREDICTED SERPL C-G-VRATE: 80 ML/MIN (ref 70–130)
EOSINOPHIL # BLD AUTO: 0.3 THOU/UL (ref 0–0.7)
EOSINOPHIL NFR BLD AUTO: 4.9 % (ref 0–10)
GLUCOSE SERPL-MCNC: 105 MG/DL (ref 83–110)
HGB BLD-MCNC: 13.5 G/DL (ref 12–16)
LYMPHOCYTES # BLD: 2.2 THOU/UL (ref 1.2–3.4)
LYMPHOCYTES NFR BLD AUTO: 39.4 % (ref 21–51)
MCH RBC QN AUTO: 29.9 PG (ref 27–31)
MCV RBC AUTO: 93.6 FL (ref 78–98)
MONOCYTES # BLD AUTO: 0.4 THOU/UL (ref 0.11–0.59)
MONOCYTES NFR BLD AUTO: 7.7 % (ref 0–10)
NEUTROPHILS # BLD AUTO: 2.6 THOU/UL (ref 1.4–6.5)
NEUTROPHILS NFR BLD AUTO: 47.3 % (ref 42–75)
PLATELET # BLD AUTO: 139 THOU/UL (ref 130–400)
POTASSIUM SERPL-SCNC: 3.9 MMOL/L (ref 3.5–5.1)
RBC # BLD AUTO: 4.52 MILL/UL (ref 4.2–5.4)
SODIUM SERPL-SCNC: 137 MMOL/L (ref 136–145)
WBC # BLD AUTO: 5.5 THOU/UL (ref 4.8–10.8)

## 2019-02-01 RX ADMIN — VANCOMYCIN HYDROCHLORIDE SCH MLS: 1 INJECTION, SOLUTION INTRAVENOUS at 11:54

## 2019-02-01 RX ADMIN — MEROPENEM AND SODIUM CHLORIDE SCH MLS: 1 INJECTION, SOLUTION INTRAVENOUS at 10:02

## 2019-02-01 RX ADMIN — MEROPENEM AND SODIUM CHLORIDE SCH MLS: 1 INJECTION, SOLUTION INTRAVENOUS at 20:04

## 2019-02-01 RX ADMIN — Medication SCH TAB: at 08:15

## 2019-02-01 RX ADMIN — ASPIRIN SCH MG: 81 TABLET ORAL at 08:16

## 2019-02-01 RX ADMIN — FAMOTIDINE SCH MG: 10 INJECTION, SOLUTION INTRAVENOUS at 08:16

## 2019-02-01 RX ADMIN — Medication SCH MG: at 08:15

## 2019-02-01 RX ADMIN — FAMOTIDINE SCH MG: 10 INJECTION, SOLUTION INTRAVENOUS at 20:05

## 2019-02-01 NOTE — PRG
DATE OF SERVICE:  02/01/2019



SERVICE:  Pulmonary Medicine.



INTERVAL HISTORY:  The patient is doing fine from respiratory standpoint.  Breathing

comfortably.  She is in no apparent distress.  She indicates that she is very happy

with the way things are going.  I asked her how things are going and what she came

in for, but she does not really quite remember.  Otherwise, there has been no

interval change to her condition. 



PHYSICAL EXAMINATION:

VITAL SIGNS:  Afebrile, pulse 94, blood pressure 113/70, respirations 18, and

saturation 92% on room air. 

GENERAL:  The patient is awake and alert, in no apparent distress. 

LUNGS:  Decreased air entry with a slightly prolonged expiratory phase.  Rhonchi are

present.  She has a very weak cough.  There is no prolonged expiratory phase or

wheezing appreciated. 

HEART:  Normal rate and regular. 

ABDOMEN:  Soft, nontender, and nondistended.  Bowel sounds are positive. 

MUSCULOSKELETAL:  No cyanosis or clubbing.  There is no pitting in bilateral lower

extremities.  She continues to have skin tenting throughout. 

GENITOURINARY:  Brown catheter in place. 

NEUROLOGIC:  Grossly nonfocal.



LABORATORY DATA:  WBC 5.5, hemoglobin 13.5, and platelets 139,000.  Basic metabolic

profile is completely unremarkable.  Her contraction alkalosis and her acute kidney

injury have both resolved.  Urinalysis is unremarkable.  Urine culture, blood

culture x2, and influenza A and B are all unremarkable. 



ASSESSMENT:  

1. Dementia, advanced.

2. Dehydration secondary to likely dementia process.

3. Contraction alkalosis, resolved.

4. Acute kidney injury, resolved.

5. Metabolic encephalopathy, resolved to baseline.



DISCUSSION AND PLAN:  From my perspective, the patient has returned to baseline.  I

really do not think we have an acute infectious process going on here.  We can

switch her over to a p.o. antibiotic and complete a 7-to 10-day duration.  From my

perspective, she is stable for transition out of the hospital back to her nursing

care unit.  Pulmonary will continue to follow intermittently during this hospital

stay. 







Job ID:  [id number]

## 2019-02-01 NOTE — PDOC.PN
- Subjective


Encounter Start Date: 02/01/19


Encounter Start Time: 12:04


Subjective: Feeling better. Off NIPPV. 


-: Denied fever and vomiting.





- Objective


Resuscitation Status - Order Detail:





01/30/19 22:59


Resuscitation Status Routine 


   Resuscitation Status: DNAR: NO Resuscitation


   Discussed with: discussed with poa by er physician








MAR Reviewed: Yes


Vital Signs & Weight: 


 Vital Signs (12 hours)











  Temp Pulse Resp BP Pulse Ox


 


 02/01/19 15:11  97.7 F  80  20  127/81  92 L


 


 02/01/19 08:57  98.4 F  94  18  113/70  92 L


 


 02/01/19 08:00      92 L








 Weight











Weight                         211 lb 3.245 oz











 Most Recent Monitor Data











Heart Rate from ECG            84


 


NIBP                           109/65


 


NIBP BP-Mean                   79


 


Respiration from ECG           23


 


SpO2                           99














I&O: 


 











 01/31/19 02/01/19 02/02/19





 06:59 06:59 06:59


 


Intake Total 31 200 1500


 


Output Total 271 360 450


 


Balance -240 -160 1050











Result Diagrams: 


 02/01/19 09:02





 02/01/19 07:08


Additional Labs: 


 Accuchecks











  02/01/19 01/31/19





  05:11 19:49


 


POC Glucose  96  174 H














Phys Exam





- Physical Examination


HEENT: moist MMs


Neck: no nodes, no JVD


Respiratory: no rales, no rhonchi


Cardiovascular: no significant murmur, no rub


Gastrointestinal: soft, non-tender, no distention


Musculoskeletal: pulses present


Neurological: non-focal, moves all 4 limbs


Psychiatric: A&O x 3





Dx/Plan


(1) Shock


Code(s): R57.9 - SHOCK, UNSPECIFIED   Status: Acute   





(2) Acute metabolic encephalopathy


Code(s): G93.41 - METABOLIC ENCEPHALOPATHY   Status: Acute   Comment: likely 

secondary to hypercapnia   





(3) Acute worsening of stage 3 chronic kidney disease


Code(s): N18.3 - CHRONIC KIDNEY DISEASE, STAGE 3 (MODERATE)   Status: Acute   

Comment: creatinine improved to 1.13 today   





(4) Acute respiratory failure with hypoxia and hypercarbia


Code(s): J96.01 - ACUTE RESPIRATORY FAILURE WITH HYPOXIA; J96.02 - ACUTE 

RESPIRATORY FAILURE WITH HYPERCAPNIA   Status: Acute   Comment: Improved, pt 

was treated with BiPAP   





(5) Moderate mitral regurgitation


Code(s): I34.0 - NONRHEUMATIC MITRAL (VALVE) INSUFFICIENCY   Status: Acute   





(6) Severe tricuspid regurgitation


Code(s): I07.1 - RHEUMATIC TRICUSPID INSUFFICIENCY   Status: Acute   





(7) UTI (urinary tract infection)


Status: Acute   





(8) Alzheimer's dementia


Code(s): G30.9 - ALZHEIMER'S DISEASE, UNSPECIFIED; F02.80 - DEMENTIA IN OTH 

DISEASES CLASSD ELSWHR W/O BEHAVRL DISTURB   Status: Chronic   Comment: 

continue Aricept   





(9) PAF (paroxysmal atrial fibrillation)


Code(s): I48.0 - PAROXYSMAL ATRIAL FIBRILLATION   Status: Chronic   





- Plan


Wean oxygen as tolerated.


-: Continue current treatments.


-: Monitor renal function.





* .

## 2019-02-02 LAB
ANION GAP SERPL CALC-SCNC: 8 MMOL/L (ref 10–20)
BUN SERPL-MCNC: 12 MG/DL (ref 9.8–20.1)
CALCIUM SERPL-MCNC: 9 MG/DL (ref 7.8–10.44)
CHLORIDE SERPL-SCNC: 105 MMOL/L (ref 98–107)
CO2 SERPL-SCNC: 33 MMOL/L (ref 23–31)
CREAT CL PREDICTED SERPL C-G-VRATE: 73 ML/MIN (ref 70–130)
GLUCOSE SERPL-MCNC: 181 MG/DL (ref 83–110)
MAGNESIUM SERPL-MCNC: 1.8 MG/DL (ref 1.6–2.6)
POTASSIUM SERPL-SCNC: 4.1 MMOL/L (ref 3.5–5.1)
SODIUM SERPL-SCNC: 142 MMOL/L (ref 136–145)

## 2019-02-02 RX ADMIN — FAMOTIDINE SCH MG: 10 INJECTION, SOLUTION INTRAVENOUS at 08:48

## 2019-02-02 RX ADMIN — INSULIN LISPRO PRN UNIT: 100 INJECTION, SOLUTION INTRAVENOUS; SUBCUTANEOUS at 17:20

## 2019-02-02 RX ADMIN — INSULIN LISPRO PRN UNIT: 100 INJECTION, SOLUTION INTRAVENOUS; SUBCUTANEOUS at 13:37

## 2019-02-02 RX ADMIN — MEROPENEM AND SODIUM CHLORIDE SCH MLS: 1 INJECTION, SOLUTION INTRAVENOUS at 08:48

## 2019-02-02 RX ADMIN — Medication SCH TAB: at 08:48

## 2019-02-02 RX ADMIN — ASPIRIN SCH MG: 81 TABLET ORAL at 08:48

## 2019-02-02 RX ADMIN — MEROPENEM AND SODIUM CHLORIDE SCH MLS: 1 INJECTION, SOLUTION INTRAVENOUS at 20:44

## 2019-02-02 RX ADMIN — INSULIN LISPRO PRN UNIT: 100 INJECTION, SOLUTION INTRAVENOUS; SUBCUTANEOUS at 06:11

## 2019-02-02 NOTE — CT
ABDOMEN CT SCAN WITHOUT IV CONTRAST

2/2/19

 

HISTORY: 

Abdominal pain and tenderness associated with shock, epigastric pain for several days. History of salome
or hysterectomy. 

 

COMPARISON:  

12/25/18

 

FINDINGS:  

Minimal pleural thickening in the lung bases with some linear stranding in  both lower lobes having m
ore of a chronic appearance and actually improved from the prior 12/25/18 study. Cardiomegaly. Status
 post cholecystectomy without ductal dilatation. Small hiatal hernia. Pancreas, spleen and adrenal gl
ands are unremarkable. Bilateral renal masses, some of which appear to be simple cysts and othe
rwise have more indeterminate appearance including at least one definite hemorrhagic cyst. These are 
stable from the prior study. No renal calculus or acute  obstruction. No evidence of large or small
 bowel obstruction. 

 

IMPRESSION:  

Mild bilateral pleural thickening with some linear parenchymal changes in the lung bases, showing imp
rovement from the prior study. Stable multiple bilateral renal small masses probably cysts including 
some hemorrhagic cysts. Small hiatal hernia. No evidence for other significant acute process in the a
bdomen.

 

The pelvis was not evaluated on this study. 

 

POS: KATIUSKA

## 2019-02-02 NOTE — PDOC.PN
- Subjective


Encounter Start Date: 02/02/19


Encounter Start Time: 13:14


Subjective: Feeling better. No fever. Reported having abdominal discomfort 

associated


-: with nausea and vomiting a day prior to presentation. Still having right


-: sided abdominal pain/tenderness. 





- Objective


Resuscitation Status - Order Detail:





01/30/19 22:59


Resuscitation Status Routine 


   Resuscitation Status: DNAR: NO Resuscitation


   Discussed with: discussed with poa by er physician








MAR Reviewed: Yes


Vital Signs & Weight: 


 Vital Signs (12 hours)











  Temp Pulse Resp BP Pulse Ox


 


 02/02/19 08:00  98.0 F  71  20  131/87  95


 


 02/02/19 03:26      97








 Weight











Weight                         211 lb 3.245 oz











 Most Recent Monitor Data











Heart Rate from ECG            84


 


NIBP                           109/65


 


NIBP BP-Mean                   79


 


Respiration from ECG           23


 


SpO2                           99














I&O: 


 











 02/01/19 02/02/19 02/03/19





 06:59 06:59 06:59


 


Intake Total 200 3700 


 


Output Total 360 3100 900


 


Balance -160 600 -900











Result Diagrams: 


 02/01/19 09:02





 02/02/19 04:41


Additional Labs: 


 Accuchecks











  02/02/19 02/02/19 02/01/19





  11:24 05:07 19:51


 


POC Glucose  173 H  187 H  263 H














Phys Exam





- Physical Examination


HEENT: moist MMs


Neck: no JVD, supple


Fair air entry bilaterally with few transmitted sound. No distress


Cardiovascular: no rub


Gastrointestinal: soft, no distention, positive bowel sounds


Obese with right lower quadrant tenderness


Musculoskeletal: no edema, pulses present


Neurological: non-focal, moves all 4 limbs


Psychiatric: normal affect, A&O x 3





Dx/Plan


(1) Shock


Code(s): R57.9 - SHOCK, UNSPECIFIED   Status: Acute   Comment: Etiology 

unclear. Resolved. required short period of Levophed.


Sepsis or and hypovolemic.


improved.   





(2) Acute metabolic encephalopathy


Code(s): G93.41 - METABOLIC ENCEPHALOPATHY   Status: Acute   Comment: likely 

secondary to hypercapnia   





(3) Acute worsening of stage 3 chronic kidney disease


Code(s): N18.3 - CHRONIC KIDNEY DISEASE, STAGE 3 (MODERATE)   Status: Acute   

Comment: creatinine improved to 1.13 today   





(4) Acute respiratory failure with hypoxia and hypercarbia


Code(s): J96.01 - ACUTE RESPIRATORY FAILURE WITH HYPOXIA; J96.02 - ACUTE 

RESPIRATORY FAILURE WITH HYPERCAPNIA   Status: Acute   Comment: Improved, pt 

was treated with BiPAP   





(5) Moderate mitral regurgitation


Code(s): I34.0 - NONRHEUMATIC MITRAL (VALVE) INSUFFICIENCY   Status: Acute   





(6) Severe tricuspid regurgitation


Code(s): I07.1 - RHEUMATIC TRICUSPID INSUFFICIENCY   Status: Acute   





(7) Alzheimer's dementia


Code(s): G30.9 - ALZHEIMER'S DISEASE, UNSPECIFIED; F02.80 - DEMENTIA IN OTH 

DISEASES CLASSD ELSWHR W/O BEHAVRL DISTURB   Status: Chronic   Comment: 

continue Aricept   





(8) PAF (paroxysmal atrial fibrillation)


Code(s): I48.0 - PAROXYSMAL ATRIAL FIBRILLATION   Status: Chronic   





- Plan


Get CT abdominal with oral contrast. No IV contrast due iodine allergy.


-: PE is a concern given transient hypoxia. Patient already on anticoagulation


-: Continue antibiotics.


-: DC Brown cath.


-: Pt eval and treat. increase activity





* .

## 2019-02-03 LAB
ANION GAP SERPL CALC-SCNC: 14 MMOL/L (ref 10–20)
BUN SERPL-MCNC: 9 MG/DL (ref 9.8–20.1)
CALCIUM SERPL-MCNC: 9.2 MG/DL (ref 7.8–10.44)
CHLORIDE SERPL-SCNC: 102 MMOL/L (ref 98–107)
CO2 SERPL-SCNC: 28 MMOL/L (ref 23–31)
CREAT CL PREDICTED SERPL C-G-VRATE: 74 ML/MIN (ref 70–130)
GLUCOSE SERPL-MCNC: 186 MG/DL (ref 83–110)
POTASSIUM SERPL-SCNC: 3.9 MMOL/L (ref 3.5–5.1)
SODIUM SERPL-SCNC: 140 MMOL/L (ref 136–145)

## 2019-02-03 RX ADMIN — INSULIN LISPRO PRN UNIT: 100 INJECTION, SOLUTION INTRAVENOUS; SUBCUTANEOUS at 16:45

## 2019-02-03 RX ADMIN — ASPIRIN SCH MG: 81 TABLET ORAL at 08:27

## 2019-02-03 RX ADMIN — Medication SCH: at 20:51

## 2019-02-03 RX ADMIN — ONDANSETRON PRN MG: 2 INJECTION INTRAMUSCULAR; INTRAVENOUS at 10:39

## 2019-02-03 RX ADMIN — MEROPENEM AND SODIUM CHLORIDE SCH MLS: 1 INJECTION, SOLUTION INTRAVENOUS at 08:47

## 2019-02-03 RX ADMIN — INSULIN LISPRO PRN UNIT: 100 INJECTION, SOLUTION INTRAVENOUS; SUBCUTANEOUS at 06:43

## 2019-02-03 RX ADMIN — ONDANSETRON PRN MG: 2 INJECTION INTRAMUSCULAR; INTRAVENOUS at 17:56

## 2019-02-03 RX ADMIN — Medication SCH TAB: at 08:27

## 2019-02-03 NOTE — PDOC.PN
- Subjective


Encounter Start Date: 02/03/19


Encounter Start Time: 12:45


Subjective: f/u for sepsis of unclear etiology tx with Meropenem. Feels better 

overall


-: and no fever documented. Tolerating po intake but does not like the taste 


-: of the food.





- Objective


Resuscitation Status - Order Detail:





01/30/19 22:59


Resuscitation Status Routine 


   Resuscitation Status: DNAR: NO Resuscitation


   Discussed with: discussed with poa by er physician








MAR Reviewed: Yes


Vital Signs & Weight: 


 Vital Signs (12 hours)











  Temp Pulse Resp BP Pulse Ox


 


 02/03/19 11:16  97.8 F  93  18  128/81  96


 


 02/03/19 08:27      93 L


 


 02/03/19 07:05  97.6 F  90  17  140/89  93 L








 Weight











Weight                         211 lb 3.245 oz











 Most Recent Monitor Data











Heart Rate from ECG            84


 


NIBP                           109/65


 


NIBP BP-Mean                   79


 


Respiration from ECG           23


 


SpO2                           99














I&O: 


 











 02/02/19 02/03/19 02/04/19





 06:59 06:59 06:59


 


Intake Total 3700 1330 150


 


Output Total 3100 1800 


 


Balance 600 -470 150











Result Diagrams: 


 02/01/19 09:02





 02/03/19 06:53


Additional Labs: 


 Accuchecks











  02/03/19 02/03/19 02/03/19





  11:17 06:03 00:43


 


POC Glucose  173 H  170 H  159 H














  02/02/19





  16:26


 


POC Glucose  173 H








Microbiology





01/30/19 20:32   Urine Straight Catheter   Urine Culture - Final


                              NO GROWTH AT 36 HOURS


01/30/19 20:32   Nasal swab   Influenza Types A,B Direct EIA - Final


01/30/19 19:42   Venous blood - Right Arm   Blood Culture - Preliminary


                              NO GROWTH AT 48 HOURS


01/30/19 19:41   Venous blood - Right Hand   Blood Culture - Preliminary


                              NO GROWTH AT 48 HOURS











Phys Exam





- Physical Examination


Constitutional: NAD


alert, responsive


HEENT: PERRLA, sclera anicteric, oral pharynx no lesions


Neck: no nodes, no JVD, supple, full ROM


diminished in bases


Respiratory: no wheezing, no rales, no rhonchi


S1, S2


Cardiovascular: RRR, no significant murmur, no rub, gallop, irregular


Gastrointestinal: soft, non-tender, no distention, positive bowel sounds


mild peripheral edema


Musculoskeletal: pulses present


Neurological: normal sensation, moves all 4 limbs


Psychiatric: A&O x 3


Skin: normal turgor, cap refill <2 seconds





Dx/Plan


(1) Acute metabolic encephalopathy


Code(s): G93.41 - METABOLIC ENCEPHALOPATHY   Status: Acute   Comment: likely 

secondary to hypercapnia, resolving and appears to be at baseline   





(2) Septic shock


Code(s): A41.9 - SEPSIS, UNSPECIFIED ORGANISM; R65.21 - SEVERE SEPSIS WITH 

SEPTIC SHOCK   Status: Resolved   Comment: Resolved   





(3) Acute respiratory failure with hypoxia and hypercarbia


Code(s): J96.01 - ACUTE RESPIRATORY FAILURE WITH HYPOXIA; J96.02 - ACUTE 

RESPIRATORY FAILURE WITH HYPERCAPNIA   Status: Acute   Comment: Improved, pt 

was treated with BiPAP initially, currently on RA   





(4) Alzheimer's dementia


Code(s): G30.9 - ALZHEIMER'S DISEASE, UNSPECIFIED; F02.80 - DEMENTIA IN OTH 

DISEASES CLASSD ELSWHR W/O BEHAVRL DISTURB   Status: Chronic   Comment: 

continue Aricept, A x O x 3   





(5) Hypertension


Code(s): I10 - ESSENTIAL (PRIMARY) HYPERTENSION   Status: Chronic   


Qualifiers: 


   Hypertension type: essential hypertension   Qualified Code(s): I10 - 

Essential (primary) hypertension   


Comment: controlled currently, review BP regimen, may need to titrate down home 

regimen   





(6) Chronic anticoagulation


Code(s): Z79.01 - LONG TERM (CURRENT) USE OF ANTICOAGULANTS   Status: Chronic   

Comment: Continue Xarelto 10mg daily   





- Plan


continue antibiotics, PT/OT, , speech therapy, DVT proph w/SCDs


Stable overall


-: D/C Meropenem


-: Start Omnicef 300mg BID


-: OOB with PT


-: Likely back to [facility] in 24h





* .

## 2019-02-04 RX ADMIN — Medication SCH ML: at 09:02

## 2019-02-04 RX ADMIN — Medication SCH: at 20:22

## 2019-02-04 RX ADMIN — Medication SCH TAB: at 09:01

## 2019-02-04 RX ADMIN — INSULIN LISPRO PRN UNIT: 100 INJECTION, SOLUTION INTRAVENOUS; SUBCUTANEOUS at 06:25

## 2019-02-04 RX ADMIN — ASPIRIN SCH MG: 81 TABLET ORAL at 09:01

## 2019-02-04 NOTE — PDOC.PN
- Subjective


Encounter Start Date: 02/04/19


Encounter Start Time: 13:20


Subjective: f/u for sepsis of unclear etiology on Meropenem initially now


-: Omnicef. c/o persistent nausea and some emesis today. + BM


-: No fever and stable vitals.





- Objective


Resuscitation Status - Order Detail:





01/30/19 22:59


Resuscitation Status Routine 


   Resuscitation Status: DNAR: NO Resuscitation


   Discussed with: discussed with poa by er physician








MAR Reviewed: Yes


Vital Signs & Weight: 


 Vital Signs (12 hours)











  Temp Pulse Resp BP Pulse Ox


 


 02/04/19 07:03  97.6 F  92  17  154/94 H  96








 Weight











Weight                         211 lb 3.245 oz











 Most Recent Monitor Data











Heart Rate from ECG            84


 


NIBP                           109/65


 


NIBP BP-Mean                   79


 


Respiration from ECG           23


 


SpO2                           99














I&O: 


 











 02/03/19 02/04/19 02/05/19





 06:59 06:59 06:59


 


Intake Total 1330 390 


 


Output Total 1800  


 


Balance -470 390 











Result Diagrams: 


 02/01/19 09:02





 02/03/19 06:53


Additional Labs: 


 Accuchecks











  02/04/19 02/04/19 02/03/19





  11:17 04:44 19:39


 


POC Glucose  193 H  202 H  165 H














  02/03/19





  15:30


 


POC Glucose  185 H








Microbiology





01/30/19 20:32   Urine Straight Catheter   Urine Culture - Final


                              NO GROWTH AT 36 HOURS


01/30/19 20:32   Nasal swab   Influenza Types A,B Direct EIA - Final


01/30/19 19:42   Venous blood - Right Arm   Blood Culture - Preliminary


                              NO GROWTH AT 48 HOURS


01/30/19 19:41   Venous blood - Right Hand   Blood Culture - Preliminary


                              NO GROWTH AT 48 HOURS











Phys Exam





- Physical Examination


Constitutional: NAD


HEENT: PERRLA, sclera anicteric, oral pharynx no lesions


Neck: no nodes, no JVD, supple, full ROM


Respiratory: no wheezing, no rales, no rhonchi, clear to auscultation bilateral


S1, S2


Cardiovascular: RRR, no significant murmur, no rub, gallop


Gastrointestinal: soft, non-tender, no distention, positive bowel sounds


Musculoskeletal: no edema, pulses present


Neurological: normal sensation, moves all 4 limbs


Skin: normal turgor, cap refill <2 seconds





Dx/Plan


(1) Nausea & vomiting


Code(s): R11.2 - NAUSEA WITH VOMITING, UNSPECIFIED   Status: Acute   Comment: 

Etiology unclear, trial Scopolamine patch, hold antibiotics   





(2) Acute metabolic encephalopathy


Code(s): G93.41 - METABOLIC ENCEPHALOPATHY   Status: Acute   Comment: likely 

secondary to hypercapnia, resolving and appears to be at baseline   





(3) Septic shock


Code(s): A41.9 - SEPSIS, UNSPECIFIED ORGANISM; R65.21 - SEVERE SEPSIS WITH 

SEPTIC SHOCK   Status: Resolved   Comment: Resolved   





(4) Acute respiratory failure with hypoxia and hypercarbia


Code(s): J96.01 - ACUTE RESPIRATORY FAILURE WITH HYPOXIA; J96.02 - ACUTE 

RESPIRATORY FAILURE WITH HYPERCAPNIA   Status: Acute   Comment: Improved, pt 

was treated with BiPAP initially, currently on RA   





(5) Alzheimer's dementia


Code(s): G30.9 - ALZHEIMER'S DISEASE, UNSPECIFIED; F02.80 - DEMENTIA IN OTH 

DISEASES CLASSD ELSWHR W/O BEHAVRL DISTURB   Status: Chronic   Comment: 

continue Aricept, A x O x 3   





(6) Hypertension


Code(s): I10 - ESSENTIAL (PRIMARY) HYPERTENSION   Status: Chronic   


Qualifiers: 


   Hypertension type: essential hypertension   Qualified Code(s): I10 - 

Essential (primary) hypertension   


Comment: controlled currently, review BP regimen, may need to titrate down home 

regimen   





(7) Chronic anticoagulation


Code(s): Z79.01 - LONG TERM (CURRENT) USE OF ANTICOAGULANTS   Status: Chronic   

Comment: Continue Xarelto 10mg daily   





- Plan


PT/OT, , DVT proph w/SCDs


Stable overall


-: D/C Omnicef


-: Start Scopolamine patch


-: Zofran prn N/V


-: Continue Xarelto





* Likely back to [facility] in 24h

## 2019-02-05 RX ADMIN — Medication SCH: at 21:27

## 2019-02-05 RX ADMIN — Medication SCH TAB: at 08:03

## 2019-02-05 RX ADMIN — INSULIN LISPRO PRN UNIT: 100 INJECTION, SOLUTION INTRAVENOUS; SUBCUTANEOUS at 04:52

## 2019-02-05 RX ADMIN — ASPIRIN SCH MG: 81 TABLET ORAL at 08:02

## 2019-02-05 RX ADMIN — INSULIN LISPRO PRN UNIT: 100 INJECTION, SOLUTION INTRAVENOUS; SUBCUTANEOUS at 17:02

## 2019-02-05 RX ADMIN — Medication SCH: at 08:03

## 2019-02-05 NOTE — PRG
DATE OF SERVICE:  02/04/2019



SERVICE:  Pulmonary Medicine.



INTERVAL HISTORY:  The patient is breathing comfortably.  She has no complaints of

chest pain, fevers, or chills.  She is on room air.  Otherwise, there has been no

interval change to her condition. 



PHYSICAL EXAMINATION:

VITAL SIGNS:  Afebrile, pulse 92, blood pressure 154/94, respirations 17, and

saturation 96% on room air. 

GENERAL:  The patient is awake and alert, in no apparent distress. 

LUNGS:  Excellent air entry.  There is absolutely no prolonged expiratory phase or

wheezing present. 

HEART:  Normal rate.  Regular. 

ABDOMEN:  Soft, nontender, and nondistended.  Bowel sounds are positive. 

MUSCULOSKELETAL:  No cyanosis or clubbing.  There is no pitting in the bilateral

lower extremities. 

NEUROLOGIC:  Grossly nonfocal.



LABORATORY DATA:  Basic metabolic profile is completely unremarkable.  Blood sugars

range from 160s to low 200s.  Influenza A and B are unremarkable.  Blood cultures x2

and urine culture negative to-date. 



IMAGING:  CT of the abdomen and pelvis demonstrates bilateral pleural thickening and

some linear parenchymal changes in the lung bases.  This is actually improving

compared to prior study.  Multiple small bilateral renal masses.  These possibly

represents cyst and/or hemorrhagic cyst.  No other acute process is identified. 



ASSESSMENT:  

1. Dementia, advanced.

2. Dehydration secondary to poor p.o. intake.

3. Contraction alkalosis, resolved.

4. Acute kidney injury, resolved.

5. Metabolic encephalopathy, resolved to baseline.



DISCUSSION AND PLAN:  At this point, the patient has no further requirements for

inpatient Pulmonary/Critical Care opinion, and I will sign off.  My suspicion is

that her presentation here was associated with a low volume state from severe

dehydration.  Whether this is because of the dementia process or from her recent

episode of nausea and vomiting is actually unclear.  We will be able to answer this

through time.  From a purely respiratory perspective, she is stable for transition

out of the hospital. 







Job ID:  [id number]

## 2019-02-06 VITALS — TEMPERATURE: 98.2 F | SYSTOLIC BLOOD PRESSURE: 130 MMHG | DIASTOLIC BLOOD PRESSURE: 78 MMHG

## 2019-02-06 LAB
ANION GAP SERPL CALC-SCNC: 15 MMOL/L (ref 10–20)
BASOPHILS # BLD AUTO: 0 THOU/UL (ref 0–0.2)
BASOPHILS NFR BLD AUTO: 0 % (ref 0–1)
BUN SERPL-MCNC: 11 MG/DL (ref 9.8–20.1)
CALCIUM SERPL-MCNC: 9.6 MG/DL (ref 7.8–10.44)
CHLORIDE SERPL-SCNC: 102 MMOL/L (ref 98–107)
CO2 SERPL-SCNC: 27 MMOL/L (ref 23–31)
CREAT CL PREDICTED SERPL C-G-VRATE: 69 ML/MIN (ref 70–130)
EOSINOPHIL # BLD AUTO: 0.2 THOU/UL (ref 0–0.7)
EOSINOPHIL NFR BLD AUTO: 2.3 % (ref 0–10)
GLUCOSE SERPL-MCNC: 192 MG/DL (ref 83–110)
HGB BLD-MCNC: 14.2 G/DL (ref 12–16)
LYMPHOCYTES # BLD: 1.7 THOU/UL (ref 1.2–3.4)
LYMPHOCYTES NFR BLD AUTO: 22.4 % (ref 21–51)
MCH RBC QN AUTO: 29.5 PG (ref 27–31)
MCV RBC AUTO: 92.3 FL (ref 78–98)
MONOCYTES # BLD AUTO: 0.8 THOU/UL (ref 0.11–0.59)
MONOCYTES NFR BLD AUTO: 10.1 % (ref 0–10)
NEUTROPHILS # BLD AUTO: 5.1 THOU/UL (ref 1.4–6.5)
NEUTROPHILS NFR BLD AUTO: 65.3 % (ref 42–75)
PLATELET # BLD AUTO: 196 THOU/UL (ref 130–400)
POTASSIUM SERPL-SCNC: 4.1 MMOL/L (ref 3.5–5.1)
RBC # BLD AUTO: 4.82 MILL/UL (ref 4.2–5.4)
SODIUM SERPL-SCNC: 140 MMOL/L (ref 136–145)
WBC # BLD AUTO: 7.7 THOU/UL (ref 4.8–10.8)

## 2019-02-06 RX ADMIN — INSULIN LISPRO PRN UNIT: 100 INJECTION, SOLUTION INTRAVENOUS; SUBCUTANEOUS at 06:03

## 2019-02-06 RX ADMIN — Medication SCH: at 08:21

## 2019-02-06 RX ADMIN — INSULIN LISPRO PRN UNIT: 100 INJECTION, SOLUTION INTRAVENOUS; SUBCUTANEOUS at 12:27

## 2019-02-06 RX ADMIN — ASPIRIN SCH MG: 81 TABLET ORAL at 08:20

## 2019-02-06 RX ADMIN — Medication SCH TAB: at 08:20

## 2021-03-21 NOTE — RAD
PORTABLE CHEST:

12/14/18

 

HISTORY: 

Fever, mental status change.

 

IMPRESSION:  

Cardiomegaly with mild vascular congestion. No focal infiltrate or significant effusion. 

 

POS: [facility]
General

## 2023-06-14 NOTE — CT
CT OF THE CERVICAL SPINE

12/13/17

 

Spiral CT of the cervical spine was performed following trauma. 

 

Comparison is made with prior study dated 5/3/17.

 

There have been no adverse interval changes. There is reversal of the normal cervical lordosis in the
 mid to lower cervical region which is no different than before. Disc spaces are narrowed below the C
4 level with a very degenerated disc seen at C6-C7. No fracture, dislocation, or soft tissue swelling
 was seen at any cervical level. The C1 to dens distance is normal. Cervical spondylosis  is present 
at all levels to some degree. In general, the facets on the right side of the cervical spine are more
 effected than those on the left. No large gross disc herniations were noted, though small ones would
 be easily missed. 

 

IMPRESSION:  

Chronic changes in the cervical spine with no acute traumatic findings. Exam unchanged from May 2017.
 

 

POS: HOME Detail Level: Zone Prescription Strength Graduated Compression Stockings Recommendations: The patient was counseled that prescription strength graduated compression stockings should be worn for all waking hours. They will follow up with a venous specialist to monitor graduated compression stocking usage and their symptoms.

## 2025-05-14 NOTE — PDOC.PN
- Subjective


Encounter Start Date: 02/05/19


Encounter Start Time: 13:00





Still has some nausea.  No other complaints.  No pain .  





- Objective


Resuscitation Status - Order Detail:





01/30/19 22:59


Resuscitation Status Routine 


   Resuscitation Status: DNAR: NO Resuscitation


   Discussed with: discussed with poa by er physician








Vital Signs & Weight: 


 Vital Signs (12 hours)











  Temp Pulse Resp BP Pulse Ox


 


 02/05/19 19:41  97.7 F  81  18  136/84  96








 Weight











Weight                         211 lb 3.245 oz











 Most Recent Monitor Data











Heart Rate from ECG            84


 


NIBP                           109/65


 


NIBP BP-Mean                   79


 


Respiration from ECG           23


 


SpO2                           99














I&O: 


 











 02/04/19 02/05/19 02/06/19





 06:59 06:59 06:59


 


Intake Total 390  


 


Balance 390  











Result Diagrams: 


 02/01/19 09:02





 02/03/19 06:53


Additional Labs: 


 Accuchecks











  02/05/19 02/05/19 02/05/19





  16:05 11:04 04:50


 


POC Glucose  204 H  174 H  205 H














Phys Exam





- Physical Examination


Respiratory: no wheezing, no rales, no rhonchi, clear to auscultation bilateral


Cardiovascular: no significant murmur, irregular


Gastrointestinal: soft, non-tender, no distention, positive bowel sounds


Musculoskeletal: no edema


Neurological: non-focal


Mild dementia.


Psychiatric: normal affect





Dx/Plan


(1) Hypovolemic shock


Code(s): R57.1 - HYPOVOLEMIC SHOCK   Status: Resolved   





(2) Dehydration


Code(s): E86.0 - DEHYDRATION   Status: Resolved   





(3) Acute metabolic encephalopathy


Code(s): G93.41 - METABOLIC ENCEPHALOPATHY   Status: Resolved   Comment: likely 

secondary to hypercapnia, resolving and appears to be at baseline   





(4) Acute worsening of stage 3 chronic kidney disease


Code(s): N18.3 - CHRONIC KIDNEY DISEASE, STAGE 3 (MODERATE)   Status: Resolved 

  Comment: creatinine improved to 1.13 today   





(5) Nausea & vomiting


Code(s): R11.2 - NAUSEA WITH VOMITING, UNSPECIFIED   Status: Acute   Comment: 

Etiology unclear, trial Scopolamine patch, held antibiotics.  Persist.   





(6) Acute respiratory failure with hypoxia and hypercarbia


Code(s): J96.01 - ACUTE RESPIRATORY FAILURE WITH HYPOXIA; J96.02 - ACUTE 

RESPIRATORY FAILURE WITH HYPERCAPNIA   Status: Acute   Comment: Improved, pt 

was treated with BiPAP initially, currently on RA   





(7) Moderate mitral regurgitation


Code(s): I34.0 - NONRHEUMATIC MITRAL (VALVE) INSUFFICIENCY   Status: Acute   





(8) Severe tricuspid regurgitation


Code(s): I07.1 - RHEUMATIC TRICUSPID INSUFFICIENCY   Status: Acute   





(9) Alzheimer's dementia


Code(s): G30.9 - ALZHEIMER'S DISEASE, UNSPECIFIED; F02.80 - DEMENTIA IN OTH 

DISEASES CLASSD ELSWHR W/O BEHAVRL DISTURB   Status: Chronic   Comment: 

continue Aricept, A x O x 3   





(10) Anxiety and depression


Code(s): F41.9 - ANXIETY DISORDER, UNSPECIFIED; F32.9 - MAJOR DEPRESSIVE 

DISORDER, SINGLE EPISODE, UNSPECIFIED   Status: Chronic   





(11) Chronic anticoagulation


Code(s): Z79.01 - LONG TERM (CURRENT) USE OF ANTICOAGULANTS   Status: Chronic   

Comment: Continue Xarelto 10mg daily   





(12) Hyperlipidemia


Code(s): E78.5 - HYPERLIPIDEMIA, UNSPECIFIED   Status: Chronic   Comment:     





(13) Hypertension


Code(s): I10 - ESSENTIAL (PRIMARY) HYPERTENSION   Status: Chronic   


Qualifiers: 


   Hypertension type: essential hypertension   Qualified Code(s): I10 - 

Essential (primary) hypertension   


Comment: controlled currently, review BP regimen, may need to titrate down home 

regimen   





(14) Obesity (BMI 30-39.9)


Code(s): E66.9 - OBESITY, UNSPECIFIED   Status: Chronic   





(15) PAF (paroxysmal atrial fibrillation)


Code(s): I48.0 - PAROXYSMAL ATRIAL FIBRILLATION   Status: Chronic   





(16) Diabetes mellitus


Code(s): E11.9 - TYPE 2 DIABETES MELLITUS WITHOUT COMPLICATIONS   Status: Acute

   





- Plan





* Euvolemic.  Still has some nausea and not taking adequate po's.  Etiology 

still unclear.  Continue scopolamine patch and prn antiemetics.  No other meds 

should be causing any of this. [Normal S1, S2] : normal S1, S2 [Soft] : abdomen soft [Non Tender] : non-tender [No Edema] : no edema [Normal] : moves all extremities, no focal deficits, normal speech [Alert and Oriented] : alert and oriented [de-identified] : knee discomfort